# Patient Record
Sex: FEMALE | Race: WHITE | Employment: UNEMPLOYED | URBAN - METROPOLITAN AREA
[De-identification: names, ages, dates, MRNs, and addresses within clinical notes are randomized per-mention and may not be internally consistent; named-entity substitution may affect disease eponyms.]

---

## 2017-02-15 RX ORDER — METOPROLOL SUCCINATE 25 MG/1
TABLET, EXTENDED RELEASE ORAL
Qty: 30 TAB | Refills: 5 | Status: SHIPPED | OUTPATIENT
Start: 2017-02-15 | End: 2017-03-08 | Stop reason: SDUPTHER

## 2017-03-08 ENCOUNTER — OFFICE VISIT (OUTPATIENT)
Dept: CARDIOLOGY CLINIC | Age: 42
End: 2017-03-08

## 2017-03-08 VITALS
WEIGHT: 182.6 LBS | OXYGEN SATURATION: 98 % | BODY MASS INDEX: 32.36 KG/M2 | RESPIRATION RATE: 18 BRPM | SYSTOLIC BLOOD PRESSURE: 120 MMHG | DIASTOLIC BLOOD PRESSURE: 78 MMHG | HEART RATE: 91 BPM | HEIGHT: 63 IN

## 2017-03-08 DIAGNOSIS — I10 ESSENTIAL HYPERTENSION: ICD-10-CM

## 2017-03-08 DIAGNOSIS — I49.9 IRREGULAR HEART BEAT: Primary | ICD-10-CM

## 2017-03-08 RX ORDER — METOPROLOL SUCCINATE 25 MG/1
25 TABLET, EXTENDED RELEASE ORAL DAILY
Qty: 90 TAB | Refills: 3 | Status: SHIPPED | OUTPATIENT
Start: 2017-03-08 | End: 2018-03-11 | Stop reason: SDUPTHER

## 2017-03-08 RX ORDER — LOSARTAN POTASSIUM 100 MG/1
100 TABLET ORAL DAILY
Qty: 90 TAB | Refills: 3 | Status: SHIPPED | OUTPATIENT
Start: 2017-03-08 | End: 2018-04-03 | Stop reason: SDUPTHER

## 2017-03-08 RX ORDER — PANTOPRAZOLE SODIUM 40 MG/1
40 TABLET, DELAYED RELEASE ORAL DAILY
Refills: 3 | COMMUNITY
Start: 2017-02-13 | End: 2017-12-12

## 2017-03-08 RX ORDER — OLOPATADINE HYDROCHLORIDE 665 UG/1
1 SPRAY NASAL DAILY
Refills: 3 | COMMUNITY
Start: 2017-01-17 | End: 2019-03-14

## 2017-03-08 NOTE — PROGRESS NOTES
NAME:  Brennen Quiroz   :   1975   MRN:   [id number]   PCP:  Graciela Rudolph MD           Subjective: The patient is a 42y.o. year old female  who returns for a routine follow-up. Since the last visit, patient reports no change in exercise tolerance, chest pain, edema, medication intolerance, palpitations, shortness of breath, PND/orthopnea wheezing, sputum, syncope, dizziness or light headedness. Doing well. Past Medical History:   Diagnosis Date    Acid reflux     Anemia     Anxiety     Asthma     no attack in yrs    Headache     Hx of colonoscopy     x 2    Hypercholesterolemia     Hypertension     Irritable bowel syndrome (IBS)     Nausea & vomiting     Other ill-defined conditions(799.89)     hypoglycemia fibromyalgia    Pneumonia         ICD-10-CM ICD-9-CM    1. Irregular heart beat I49.9 427.9 AMB POC EKG ROUTINE /  LEADS, INTER & REP   2. Essential hypertension I10 401.9       Social History   Substance Use Topics    Smoking status: Never Smoker    Smokeless tobacco: Never Used    Alcohol use 1.2 - 1.8 oz/week     2 - 3 Glasses of wine per week      Comment: occ      Family History   Problem Relation Age of Onset    Cancer Maternal Grandmother      breast abdomnal    Cancer Paternal Grandmother      panreatic    Headache Father     Headache Sister     Cancer Child      spina bifida        Review of Systems  General: Pt denies excessive weight gain or loss. Pt is able to conduct ADL's  HEENT: Denies blurred vision, headaches, epistaxis and difficulty swallowing. Respiratory: Denies shortness of breath, SHANNON, wheezing or stridor.   Cardiovascular: Denies precordial pain, palpitations, edema or PND  Gastrointestinal: Denies poor appetite, indigestion, abdominal pain or blood in stool  Musculoskeletal: Denies pain or swelling from muscles or joints  Neurologic: Denies tremor, paresthesias, or sensory motor disturbance  Skin: Denies rash, itching or texture change. Objective:       Vitals:    03/08/17 1059 03/08/17 1108   BP: 122/70 120/78   Pulse: 91    Resp: 18    SpO2: 98%    Weight: 182 lb 9.6 oz (82.8 kg)    Height: 5' 3\" (1.6 m)     Body mass index is 32.35 kg/(m^2). General PE  Mental Status - Alert. General Appearance - Not in acute distress. Chest and Lung Exam   Inspection: Accessory muscles - No use of accessory muscles in breathing. Auscultation:   Breath sounds: - Normal.    Cardiovascular   Inspection: Jugular vein - Bilateral - Inspection Normal.  Palpation/Percussion:   Apical Impulse: - Normal.  Auscultation: Rhythm - Regular. Heart Sounds - S1 WNL and S2 WNL. No S3 or S4. Murmurs & Other Heart Sounds: Auscultation of the heart reveals - No Murmurs. Peripheral Vascular   Upper Extremity: Inspection - Bilateral - No Cyanotic nailbeds or Digital clubbing. Lower Extremity:   Palpation: Edema - Bilateral - No edema. Data Review:     EKG -EKG: normal EKG, normal sinus rhythm, unchanged from previous tracings. Medications reviewed  Current Outpatient Prescriptions   Medication Sig    olopatadine (PATANASE) 0.6 % spry 1 Squirt by Both Nostrils route daily.  pantoprazole (PROTONIX) 40 mg tablet Take 40 mg by mouth daily.  metoprolol succinate (TOPROL-XL) 25 mg XL tablet Take 1 Tab by mouth daily.  losartan (COZAAR) 100 mg tablet Take 1 Tab by mouth daily.  promethazine (PHENERGAN) 25 mg tablet Take 1 Tab by mouth every six (6) hours as needed for Nausea.  CRYSELLE, 28, 0.3-30 mg-mcg tab     escitalopram oxalate (LEXAPRO) 10 mg tablet Take 1 Tab by mouth daily.  zolpidem (AMBIEN) 5 mg tablet Take 1 Tab by mouth nightly as needed.  diazePAM (VALIUM) 2 mg tablet Take 1 Tab by mouth every eight (8) hours as needed (dizziness). Max Daily Amount: 6 mg.  RABEprazole (ACIPHEX) 20 mg tablet Take 100 mg by mouth daily. No current facility-administered medications for this visit. Assessment:       ICD-10-CM ICD-9-CM    1. Irregular heart beat I49.9 427.9 AMB POC EKG ROUTINE W/ 12 LEADS, INTER & REP   2. Essential hypertension I10 401.9         Plan:     Patient presents doing well and stable from cardiac standpoint. Feeling well on current therapy. Continue current care and follow up in one year. .    Lorenzo Cruz MD

## 2017-03-08 NOTE — MR AVS SNAPSHOT
Visit Information Date & Time Provider Department Dept. Phone Encounter #  
 3/8/2017 11:15 AM Leslye Mcfadden MD [facility] [phone] [id number] Your Appointments 4/21/2017 11:40 AM  
Follow Up with Rian Barbour MD  
[facility] at [facility] [address]) Appt Note: f/u migraine, $40cp cc jrb 10/3/16  
 [address] [place] [address], 
[address] [facility], [address] [address]  
[address], [address] [facility], [address] [address] Upcoming Health Maintenance Date Due DTaP/Tdap/Td series (1 - Tdap) 4/19/1995 PAP AKA CERVICAL CYTOLOGY 4/19/1995 INFLUENZA AGE 9 TO ADULT 8/1/2016 Allergies as of 3/8/2017  Review Complete On: 3/8/2017 By: Nayeli Mcgowan NP Severity Noted Reaction Type Reactions Avelox [Moxifloxacin] High 08/21/2011   Systemic Other (comments) Elevated liver enzymes Erythromycin Medium 04/12/2010   Intolerance Nausea and Vomiting Levaquin [Levofloxacin] Medium 04/12/2010   Intolerance Nausea and Vomiting Zithromax [Azithromycin] Medium 04/12/2010   Side Effect Hives Current Immunizations  Reviewed on 11/15/2016 No immunizations on file. Not reviewed this visit You Were Diagnosed With   
  
 Codes Comments Irregular heart beat    -  Primary ICD-10-CM: I49.9 ICD-9-CM: 427.9 Essential hypertension     ICD-10-CM: I10 
ICD-9-CM: 401.9 Vitals BP Pulse Resp Height(growth percentile) Weight(growth percentile) SpO2  
 120/78 (BP 1 Location: Right arm, BP Patient Position: Sitting) 91 18 5' 3\" (1.6 m) 182 lb 9.6 oz (82.8 kg) 98% BMI OB Status Smoking Status 32.35 kg/m2 Having regular periods Never Smoker Vitals History BMI and BSA Data Body Mass Index Body Surface Area  
 32.35 kg/m 2 1.92 m 2 Preferred Pharmacy Pharmacy Name Phone [facility]/PHARMACY #[id number]- [place] [phone] [facility] [phone] Your Updated Medication List  
  
   
This list is accurate as of: 3/8/17 11:36 AM.  Always use your most recent med list.  
  
  
  
  
 ACIPHEX 20 mg tablet Generic drug:  RABEprazole Take 100 mg by mouth daily. CRYSELLE (28) 0.3-30 mg-mcg Tab Generic drug:  norgestrel-ethinyl estradiol  
  
 diazePAM 2 mg tablet Commonly known as:  VALIUM Take 1 Tab by mouth every eight (8) hours as needed (dizziness). Max Daily Amount: 6 mg.  
  
 escitalopram oxalate 10 mg tablet Commonly known as:  Shan Barrs Take 1 Tab by mouth daily. losartan 100 mg tablet Commonly known as:  COZAAR Take 1 Tab by mouth daily. metoprolol succinate 25 mg XL tablet Commonly known as:  TOPROL-XL Take 1 Tab by mouth daily. olopatadine 0.6 % Spry Commonly known as:  PATANASE  
1 Squirt by Both Nostrils route daily. pantoprazole 40 mg tablet Commonly known as:  PROTONIX Take 40 mg by mouth daily. promethazine 25 mg tablet Commonly known as:  PHENERGAN Take 1 Tab by mouth every six (6) hours as needed for Nausea. zolpidem 5 mg tablet Commonly known as:  AMBIEN Take 1 Tab by mouth nightly as needed. Prescriptions Sent to Pharmacy Refills  
 metoprolol succinate (TOPROL-XL) 25 mg XL tablet 3 Sig: Take 1 Tab by mouth daily. Class: Normal  
 Pharmacy: [facility]/pharmacy #[id number]- [address] Ph #: [phone] Route: Oral  
 losartan (COZAAR) 100 mg tablet 3 Sig: Take 1 Tab by mouth daily. Class: Normal  
 Pharmacy: [facility]/pharmacy #[id number]- [address] Ph #: [phone] Route: Oral  
  
We Performed the Following AMB POC EKG ROUTINE W/ 12 LEADS, INTER & REP [27958 CPT(R)] Introducing \[facility] [place] [facility]\"" & HEALTH SERVICES! Dear [address]: Thank you for requesting a [employer] account. Our records indicate that you already have an active Par[employer] account. You can access your account anytime at [email or web address] [email or web address] Did you know that you can access your hospital and ER discharge instructions at any time in [employer]? You can also review all of your test results from your hospital stay or ER visit. Additional Information If you have questions, please visit the Frequently Asked Questions section of the [employer] website at [email or web address] Remember, [employer] is NOT to be used for urgent needs. For medical emergencies, dial 911. Now available from your iPhone and Android! Please provide this summary of care documentation to your next provider. Your primary care clinician is listed as Leeroy Gutierrez. If you have any questions after today's visit, please call [phone].

## 2017-12-12 ENCOUNTER — APPOINTMENT (OUTPATIENT)
Dept: GENERAL RADIOLOGY | Age: 42
End: 2017-12-12
Attending: EMERGENCY MEDICINE
Payer: COMMERCIAL

## 2017-12-12 ENCOUNTER — HOSPITAL ENCOUNTER (EMERGENCY)
Age: 42
Discharge: HOME | End: 2017-12-12
Attending: EMERGENCY MEDICINE
Payer: COMMERCIAL

## 2017-12-12 VITALS
OXYGEN SATURATION: 99 % | DIASTOLIC BLOOD PRESSURE: 100 MMHG | HEIGHT: 63 IN | SYSTOLIC BLOOD PRESSURE: 140 MMHG | HEART RATE: 76 BPM | TEMPERATURE: 98.2 F | WEIGHT: 185 LBS | RESPIRATION RATE: 19 BRPM | BODY MASS INDEX: 32.78 KG/M2

## 2017-12-12 DIAGNOSIS — R00.2 PALPITATIONS: ICD-10-CM

## 2017-12-12 DIAGNOSIS — J01.90 ACUTE NON-RECURRENT SINUSITIS, UNSPECIFIED LOCATION: ICD-10-CM

## 2017-12-12 DIAGNOSIS — R42 LIGHTHEADEDNESS: Primary | ICD-10-CM

## 2017-12-12 LAB
ALBUMIN SERPL-MCNC: 3.5 G/DL (ref 3.5–5)
ALBUMIN/GLOB SERPL: 0.9 {RATIO} (ref 1.1–2.2)
ALP SERPL-CCNC: 89 U/L (ref 45–117)
ALT SERPL-CCNC: 39 U/L (ref 12–78)
ANION GAP SERPL CALC-SCNC: 10 MMOL/L (ref 5–15)
APPEARANCE UR: ABNORMAL
AST SERPL-CCNC: 19 U/L (ref 15–37)
BACTERIA URNS QL MICRO: NEGATIVE /HPF
BASOPHILS # BLD: 0 K/UL (ref 0–0.1)
BASOPHILS NFR BLD: 0 % (ref 0–1)
BILIRUB SERPL-MCNC: 0.3 MG/DL (ref 0.2–1)
BILIRUB UR QL: NEGATIVE
BUN SERPL-MCNC: 9 MG/DL (ref 6–20)
BUN/CREAT SERPL: 9 (ref 12–20)
CALCIUM SERPL-MCNC: 8.5 MG/DL (ref 8.5–10.1)
CHLORIDE SERPL-SCNC: 105 MMOL/L (ref 97–108)
CO2 SERPL-SCNC: 26 MMOL/L (ref 21–32)
COLOR UR: ABNORMAL
CREAT SERPL-MCNC: 0.99 MG/DL (ref 0.55–1.02)
D DIMER PPP FEU-MCNC: 0.2 MG/L FEU (ref 0–0.65)
EOSINOPHIL # BLD: 0.2 K/UL (ref 0–0.4)
EOSINOPHIL NFR BLD: 2 % (ref 0–7)
EPITH CASTS URNS QL MICRO: ABNORMAL /LPF
ERYTHROCYTE [DISTWIDTH] IN BLOOD BY AUTOMATED COUNT: 14.3 % (ref 11.5–14.5)
GLOBULIN SER CALC-MCNC: 3.9 G/DL (ref 2–4)
GLUCOSE SERPL-MCNC: 75 MG/DL (ref 65–100)
GLUCOSE UR STRIP.AUTO-MCNC: NEGATIVE MG/DL
HCG UR QL: NEGATIVE
HCT VFR BLD AUTO: 40.1 % (ref 35–47)
HGB BLD-MCNC: 13.8 G/DL (ref 11.5–16)
HGB UR QL STRIP: NEGATIVE
HYALINE CASTS URNS QL MICRO: ABNORMAL /LPF (ref 0–5)
KETONES UR QL STRIP.AUTO: NEGATIVE MG/DL
LEUKOCYTE ESTERASE UR QL STRIP.AUTO: ABNORMAL
LYMPHOCYTES # BLD: 3.6 K/UL (ref 0.8–3.5)
LYMPHOCYTES NFR BLD: 28 % (ref 12–49)
MCH RBC QN AUTO: 28.1 PG (ref 26–34)
MCHC RBC AUTO-ENTMCNC: 34.4 G/DL (ref 30–36.5)
MCV RBC AUTO: 81.7 FL (ref 80–99)
MONOCYTES # BLD: 0.6 K/UL (ref 0–1)
MONOCYTES NFR BLD: 5 % (ref 5–13)
NEUTS SEG # BLD: 8.3 K/UL (ref 1.8–8)
NEUTS SEG NFR BLD: 65 % (ref 32–75)
NITRITE UR QL STRIP.AUTO: NEGATIVE
PH UR STRIP: 6.5 [PH] (ref 5–8)
PLATELET # BLD AUTO: 324 K/UL (ref 150–400)
POTASSIUM SERPL-SCNC: 2.9 MMOL/L (ref 3.5–5.1)
PROT SERPL-MCNC: 7.4 G/DL (ref 6.4–8.2)
PROT UR STRIP-MCNC: NEGATIVE MG/DL
RBC # BLD AUTO: 4.91 M/UL (ref 3.8–5.2)
RBC #/AREA URNS HPF: ABNORMAL /HPF (ref 0–5)
SODIUM SERPL-SCNC: 141 MMOL/L (ref 136–145)
SP GR UR REFRACTOMETRY: 1.01 (ref 1–1.03)
TROPONIN I SERPL-MCNC: <0.04 NG/ML
UA: UC IF INDICATED,UAUC: ABNORMAL
UROBILINOGEN UR QL STRIP.AUTO: 0.2 EU/DL (ref 0.2–1)
WBC # BLD AUTO: 12.7 K/UL (ref 3.6–11)
WBC URNS QL MICRO: ABNORMAL /HPF (ref 0–4)

## 2017-12-12 PROCEDURE — 80053 COMPREHEN METABOLIC PANEL: CPT | Performed by: EMERGENCY MEDICINE

## 2017-12-12 PROCEDURE — 81001 URINALYSIS AUTO W/SCOPE: CPT | Performed by: EMERGENCY MEDICINE

## 2017-12-12 PROCEDURE — 74011250636 HC RX REV CODE- 250/636: Performed by: EMERGENCY MEDICINE

## 2017-12-12 PROCEDURE — 87086 URINE CULTURE/COLONY COUNT: CPT | Performed by: EMERGENCY MEDICINE

## 2017-12-12 PROCEDURE — 96360 HYDRATION IV INFUSION INIT: CPT

## 2017-12-12 PROCEDURE — 85379 FIBRIN DEGRADATION QUANT: CPT | Performed by: EMERGENCY MEDICINE

## 2017-12-12 PROCEDURE — 74011250637 HC RX REV CODE- 250/637: Performed by: EMERGENCY MEDICINE

## 2017-12-12 PROCEDURE — 93005 ELECTROCARDIOGRAM TRACING: CPT

## 2017-12-12 PROCEDURE — 36415 COLL VENOUS BLD VENIPUNCTURE: CPT | Performed by: EMERGENCY MEDICINE

## 2017-12-12 PROCEDURE — 81025 URINE PREGNANCY TEST: CPT

## 2017-12-12 PROCEDURE — 99284 EMERGENCY DEPT VISIT MOD MDM: CPT

## 2017-12-12 PROCEDURE — 84484 ASSAY OF TROPONIN QUANT: CPT | Performed by: EMERGENCY MEDICINE

## 2017-12-12 PROCEDURE — 85025 COMPLETE CBC W/AUTO DIFF WBC: CPT | Performed by: EMERGENCY MEDICINE

## 2017-12-12 PROCEDURE — 71020 XR CHEST PA LAT: CPT

## 2017-12-12 RX ORDER — POTASSIUM CHLORIDE 750 MG/1
60 TABLET, FILM COATED, EXTENDED RELEASE ORAL
Status: DISCONTINUED | OUTPATIENT
Start: 2017-12-12 | End: 2017-12-12 | Stop reason: ALTCHOICE

## 2017-12-12 RX ORDER — CETIRIZINE HCL 10 MG
TABLET ORAL DAILY
COMMUNITY

## 2017-12-12 RX ORDER — POTASSIUM CHLORIDE 1.5 G/1.77G
60 POWDER, FOR SOLUTION ORAL
Status: COMPLETED | OUTPATIENT
Start: 2017-12-12 | End: 2017-12-12

## 2017-12-12 RX ADMIN — POTASSIUM CHLORIDE 60 MEQ: 1.5 POWDER, FOR SOLUTION ORAL at 18:36

## 2017-12-12 RX ADMIN — SODIUM CHLORIDE 1000 ML: 900 INJECTION, SOLUTION INTRAVENOUS at 14:55

## 2017-12-12 NOTE — ED NOTES
Received pt to exam room for c/o feeling like her heart was racing. Pt went to [facility] and states almost passed out. She was also having some chest pressure at the time of this occurrence. [facility] gave 4 baby ASA and called EMS.

## 2017-12-12 NOTE — ED NOTES
Pt alert shin warm dry pink po KCL given spouse at bedside, d/c instructions reviewed by MD, pt to exit without difficulty or questions

## 2017-12-12 NOTE — DISCHARGE INSTRUCTIONS
Lightheadedness or Faintness: Care Instructions  Your Care Instructions  Lightheadedness is a feeling that you are about to faint or \"pass out. \" You do not feel as if you or your surroundings are moving. It is different from vertigo, which is the feeling that you or things around you are spinning or tilting. Lightheadedness usually goes away or gets better when you lie down. If lightheadedness gets worse, it can lead to a fainting spell. It is common to feel lightheaded from time to time. Lightheadedness usually is not caused by a serious problem. It often is caused by a short-lasting drop in blood pressure and blood flow to your head that occurs when you get up too quickly from a seated or lying position. Follow-up care is a key part of your treatment and safety. Be sure to make and go to all appointments, and call your doctor if you are having problems. It's also a good idea to know your test results and keep a list of the medicines you take. How can you care for yourself at home? · Lie down for 1 or 2 minutes when you feel lightheaded. After lying down, sit up slowly and remain sitting for 1 to 2 minutes before slowly standing up. · Avoid movements, positions, or activities that have made you lightheaded in the past.  · Get plenty of rest, especially if you have a cold or flu, which can cause lightheadedness. · Make sure you drink plenty of fluids, especially if you have a fever or have been sweating. · Do not drive or put yourself and others in danger while you feel lightheaded. When should you call for help? Call 911 anytime you think you may need emergency care. For example, call if:  ? · You have symptoms of a stroke. These may include:  ¨ Sudden numbness, tingling, weakness, or loss of movement in your face, arm, or leg, especially on only one side of your body. ¨ Sudden vision changes. ¨ Sudden trouble speaking. ¨ Sudden confusion or trouble understanding simple statements.   ¨ Sudden problems with walking or balance. ¨ A sudden, severe headache that is different from past headaches. ? · You have symptoms of a heart attack. These may include:  ¨ Chest pain or pressure, or a strange feeling in the chest.  ¨ Sweating. ¨ Shortness of breath. ¨ Nausea or vomiting. ¨ Pain, pressure, or a strange feeling in the back, neck, jaw, or upper belly or in one or both shoulders or arms. ¨ Lightheadedness or sudden weakness. ¨ A fast or irregular heartbeat. After you call 911, the  may tell you to chew 1 adult-strength or 2 to 4 low-dose aspirin. Wait for an ambulance. Do not try to drive yourself. ? Watch closely for changes in your health, and be sure to contact your doctor if:  ? · Your lightheadedness gets worse or does not get better with home care. Where can you learn more? Go to http://hansD-Sightjannette.info/. Enter Q123 in the search box to learn more about \"Lightheadedness or Faintness: Care Instructions. \"  Current as of: March 20, 2017  Content Version: 11.4  © 6129-8076 [employer]. Care instructions adapted under license by [employer] (which disclaims liability or warranty for this information). If you have questions about a medical condition or this instruction, always ask your healthcare professional. [address] any warranty or liability for your use of this information. Palpitations: Care Instructions  Your Care Instructions    Heart palpitations are the uncomfortable sensation that your heart is beating fast or irregularly. You might feel pounding or fluttering in your chest. It might feel like your heart is skipping a beat. Although palpitations may be caused by a heart problem, they also occur because of stress, fatigue, or use of alcohol, caffeine, or nicotine. Many medicines, including diet pills, antihistamines, decongestants, and some herbal products, can cause heart palpitations.  Nearly everyone has palpitations from time to time. Depending on your symptoms, your doctor may need to do more tests to try to find the cause of your palpitations. Follow-up care is a key part of your treatment and safety. Be sure to make and go to all appointments, and call your doctor if you are having problems. It's also a good idea to know your test results and keep a list of the medicines you take. How can you care for yourself at home? · Avoid caffeine, nicotine, and excess alcohol. · Do not take illegal drugs, such as methamphetamines and cocaine. · Do not take weight loss or diet medicines unless you talk with your doctor first.  · Get plenty of sleep. · Do not overeat. · If you have palpitations again, take deep breaths and try to relax. This may slow a racing heart. · If you start to feel lightheaded, lie down to avoid injuries that might result if you pass out and fall down. · Keep a record of your palpitations and bring it to your next doctor's appointment. Write down:  ¨ The date and time. ¨ Your pulse. (If your heart is beating fast, it may be hard to count your pulse.)  ¨ What you were doing when the palpitations started. ¨ How long the palpitations lasted. ¨ Any other symptoms. · If an activity causes palpitations, slow down or stop. Talk to your doctor before you do that activity again. · Take your medicines exactly as prescribed. Call your doctor if you think you are having a problem with your medicine. When should you call for help? Call 911 anytime you think you may need emergency care. For example, call if:  ? · You passed out (lost consciousness). ? · You have symptoms of a heart attack. These may include:  ¨ Chest pain or pressure, or a strange feeling in the chest.  ¨ Sweating. ¨ Shortness of breath. ¨ Pain, pressure, or a strange feeling in the back, neck, jaw, or upper belly or in one or both shoulders or arms. ¨ Lightheadedness or sudden weakness.   ¨ A fast or irregular heartbeat. After you call 911, the  may tell you to chew 1 adult-strength or 2 to 4 low-dose aspirin. Wait for an ambulance. Do not try to drive yourself. ? · You have symptoms of a stroke. These may include:  ¨ Sudden numbness, tingling, weakness, or loss of movement in your face, arm, or leg, especially on only one side of your body. ¨ Sudden vision changes. ¨ Sudden trouble speaking. ¨ Sudden confusion or trouble understanding simple statements. ¨ Sudden problems with walking or balance. ¨ A sudden, severe headache that is different from past headaches. ?Call your doctor now or seek immediate medical care if:  ? · You have heart palpitations and:  ¨ Are dizzy or lightheaded, or you feel like you may faint. ¨ Have new or increased shortness of breath. ? Watch closely for changes in your health, and be sure to contact your doctor if:  ? · You continue to have heart palpitations. Where can you learn more? Go to http://hans-jannette.info/. Enter R508 in the search box to learn more about \"Palpitations: Care Instructions. \"  Current as of: September 21, 2016  Content Version: 11.4  © 2485-8710 [email or web address]. Care instructions adapted under license by [employer] (which disclaims liability or warranty for this information). If you have questions about a medical condition or this instruction, always ask your healthcare professional. [place] [address] any warranty or liability for your use of this information.

## 2017-12-12 NOTE — ED PROVIDER NOTES
EMERGENCY DEPARTMENT HISTORY AND PHYSICAL EXAM      Date: 12/12/2017  Patient Name: Brennen Quiroz    History of Presenting Illness     Chief Complaint   Patient presents with    Other     Not feeling well and feeling like heart racing - referred by [facility]     History Provided By: Patient    HPI: Brennen Quiroz, 36 y.o. female with PMHx significant for fibromyalgia, HTN, asthma and mitral valve prolapse, presents via EMS to the ED with cc of sudden onset palpitations with lightheadedness, diaphoresis, and nausea that began around 12:00 PM this afternoon. Pt reports going to [facility] where she almost passed out and was transported to the ED. She notes chest tightness ongoing 2 days. Pt states that she drove to [facility] and went on a cruise 2 weeks ago. She began to experienced congestion/cough/ear pain and was diagnosed with a sinus infection by [facility] 1 week ago and was discharged with Augmentin. She notes experiencing right calf pain 3 weeks ago but has not had it since. Pt takes hormonal birth control pills. She denies a history of DVT/PE. Pt specifically denies appetite change, hemoptysis, throat pain, abdominal pain, syncope, vomiting, diarrhea, dysuria, hematuria, or SOB. PCP: Melisa Frost MD     Social Hx: - Tobacco, + (weekly) EtOH, - Illicit Drugs    There are no other complaints, changes, or physical findings at this time. Current Outpatient Prescriptions   Medication Sig Dispense Refill    cetirizine (ZYRTEC) 10 mg tablet Take  by mouth.  olopatadine (PATANASE) 0.6 % spry 1 Squirt by Both Nostrils route daily. 3    metoprolol succinate (TOPROL-XL) 25 mg XL tablet Take 1 Tab by mouth daily. 90 Tab 3    losartan (COZAAR) 100 mg tablet Take 1 Tab by mouth daily. 90 Tab 3    promethazine (PHENERGAN) 25 mg tablet Take 1 Tab by mouth every six (6) hours as needed for Nausea.  12 Tab 0    CRYSELLE, 28, 0.3-30 mg-mcg tab   12    escitalopram oxalate (LEXAPRO) 10 mg tablet Take 1 Tab by mouth daily. 30 Tab 1    zolpidem (AMBIEN) 5 mg tablet Take 1 Tab by mouth nightly as needed. 30 Tab 1    RABEprazole (ACIPHEX) 20 mg tablet Take 100 mg by mouth daily. Past History     Past Medical History:  Past Medical History:   Diagnosis Date    Acid reflux     Anemia     Anxiety     Asthma     no attack in yrs    Headache     Hx of colonoscopy     x 2    Hypercholesterolemia     Hypertension     Irritable bowel syndrome (IBS)     Nausea & vomiting     Other ill-defined conditions(799.89)     hypoglycemia fibromyalgia    Pneumonia 9/13     Past Surgical History:  Past Surgical History:   Procedure Laterality Date    HX APPENDECTOMY  8/15/14    HX CHOLECYSTECTOMY  2007    gallstones pancratitis     HX ENDOSCOPY      x 3    HX GYN      vaginal birth x 1    HX HEENT      lasix    HX HEENT      bilateral eye surgery    HX HEENT      oral surgery    HX HEENT      septal surg     Family History:  Family History   Problem Relation Age of Onset    Cancer Maternal Grandmother      breast abdomnal    Cancer Paternal Grandmother      panreatic    Headache Father     Headache Sister     Cancer Child      spina bifida     Social History:  Social History   Substance Use Topics    Smoking status: Never Smoker    Smokeless tobacco: Never Used    Alcohol use 1.2 - 1.8 oz/week     2 - 3 Glasses of wine per week      Comment: occ     Allergies: Allergies   Allergen Reactions    Avelox [Moxifloxacin] Other (comments)     Elevated liver enzymes    Erythromycin Nausea and Vomiting    Levaquin [Levofloxacin] Nausea and Vomiting    Zithromax [Azithromycin] Hives     Review of Systems   Review of Systems   Constitutional: Positive for diaphoresis. Negative for appetite change, chills, fatigue and fever. HENT: Positive for congestion and ear pain. Negative for rhinorrhea and sore throat. Eyes: Negative for pain, discharge and visual disturbance.    Respiratory: Positive for cough and chest tightness. Negative for shortness of breath and wheezing.         - hemoptysis    Cardiovascular: Positive for palpitations. Negative for leg swelling. Gastrointestinal: Positive for nausea. Negative for abdominal pain, constipation, diarrhea and vomiting. Genitourinary: Negative for dysuria, frequency and hematuria. Musculoskeletal: Negative for arthralgias, back pain and myalgias. Skin: Negative for rash. Neurological: Positive for dizziness and light-headedness. Negative for syncope, weakness and headaches. Psychiatric/Behavioral: Negative. Physical Exam   Physical Exam   Constitutional: She is oriented to person, place, and time. She appears well-developed and well-nourished. No distress. HENT:   Head: Normocephalic and atraumatic. Right Ear: Tympanic membrane normal.   Left Ear: Tympanic membrane normal.   Mouth/Throat: Oropharynx is clear and moist. No oropharyngeal exudate. Eyes: EOM are normal. Right eye exhibits no discharge. Left eye exhibits no discharge. No scleral icterus. Neck: Normal range of motion. Neck supple. No tracheal deviation present. Cardiovascular: Normal rate, regular rhythm, normal heart sounds and intact distal pulses. Exam reveals no gallop and no friction rub. No murmur heard. Pulmonary/Chest: Effort normal and breath sounds normal. No respiratory distress. She has no wheezes. She has no rales. Abdominal: Soft. She exhibits no distension. There is no tenderness. Musculoskeletal: Normal range of motion. She exhibits no edema. No right calf erythema, edema, or tenderness    Lymphadenopathy:     She has no cervical adenopathy. Neurological: She is alert and oriented to person, place, and time. No focal neuro deficits   Skin: Skin is warm and dry. No rash noted. Psychiatric: She has a normal mood and affect. Nursing note and vitals reviewed.     Diagnostic Study Results     Labs -     Recent Results (from the past 12 hour(s))   EKG, 12 LEAD, INITIAL    Collection Time: 12/12/17  3:05 PM   Result Value Ref Range    Ventricular Rate 71 BPM    Atrial Rate 71 BPM    P-R Interval 158 ms    QRS Duration 82 ms    Q-T Interval 404 ms    QTC Calculation (Bezet) 439 ms    Calculated P Axis 52 degrees    Calculated R Axis 19 degrees    Calculated T Axis 41 degrees    Diagnosis       Normal sinus rhythm  Normal ECG  When compared with ECG of 15-NOV-2016 10:36,  No significant change was found     CBC WITH AUTOMATED DIFF    Collection Time: 12/12/17  3:18 PM   Result Value Ref Range    WBC 12.7 (H) 3.6 - 11.0 K/uL    RBC 4.91 3.80 - 5.20 M/uL    HGB 13.8 11.5 - 16.0 g/dL    HCT 40.1 35.0 - 47.0 %    MCV 81.7 80.0 - 99.0 FL    MCH 28.1 26.0 - 34.0 PG    MCHC 34.4 30.0 - 36.5 g/dL    RDW 14.3 11.5 - 14.5 %    PLATELET 016 888 - 042 K/uL    NEUTROPHILS 65 32 - 75 %    LYMPHOCYTES 28 12 - 49 %    MONOCYTES 5 5 - 13 %    EOSINOPHILS 2 0 - 7 %    BASOPHILS 0 0 - 1 %    ABS. NEUTROPHILS 8.3 (H) 1.8 - 8.0 K/UL    ABS. LYMPHOCYTES 3.6 (H) 0.8 - 3.5 K/UL    ABS. MONOCYTES 0.6 0.0 - 1.0 K/UL    ABS. EOSINOPHILS 0.2 0.0 - 0.4 K/UL    ABS. BASOPHILS 0.0 0.0 - 0.1 K/UL   METABOLIC PANEL, COMPREHENSIVE    Collection Time: 12/12/17  3:18 PM   Result Value Ref Range    Sodium 141 136 - 145 mmol/L    Potassium 2.9 (L) 3.5 - 5.1 mmol/L    Chloride 105 97 - 108 mmol/L    CO2 26 21 - 32 mmol/L    Anion gap 10 5 - 15 mmol/L    Glucose 75 65 - 100 mg/dL    BUN 9 6 - 20 MG/DL    Creatinine 0.99 0.55 - 1.02 MG/DL    BUN/Creatinine ratio 9 (L) 12 - 20      GFR est AA >60 >60 ml/min/1.73m2    GFR est non-AA >60 >60 ml/min/1.73m2    Calcium 8.5 8.5 - 10.1 MG/DL    Bilirubin, total 0.3 0.2 - 1.0 MG/DL    ALT (SGPT) 39 12 - 78 U/L    AST (SGOT) 19 15 - 37 U/L    Alk.  phosphatase 89 45 - 117 U/L    Protein, total 7.4 6.4 - 8.2 g/dL    Albumin 3.5 3.5 - 5.0 g/dL    Globulin 3.9 2.0 - 4.0 g/dL    A-G Ratio 0.9 (L) 1.1 - 2.2     TROPONIN I    Collection Time: 12/12/17  3:18 PM   Result Value Ref Range    Troponin-I, Qt. <0.04 <0.05 ng/mL   D DIMER    Collection Time: 12/12/17  3:18 PM   Result Value Ref Range    D-dimer 0.20 0.00 - 0.65 mg/L FEU   URINALYSIS W/ REFLEX CULTURE    Collection Time: 12/12/17  3:18 PM   Result Value Ref Range    Color YELLOW/STRAW      Appearance CLOUDY (A) CLEAR      Specific gravity 1.010 1.003 - 1.030      pH (UA) 6.5 5.0 - 8.0      Protein NEGATIVE  NEG mg/dL    Glucose NEGATIVE  NEG mg/dL    Ketone NEGATIVE  NEG mg/dL    Bilirubin NEGATIVE  NEG      Blood NEGATIVE  NEG      Urobilinogen 0.2 0.2 - 1.0 EU/dL    Nitrites NEGATIVE  NEG      Leukocyte Esterase MODERATE (A) NEG      WBC 20-50 0 - 4 /hpf    RBC 0-5 0 - 5 /hpf    Epithelial cells MODERATE (A) FEW /lpf    Bacteria NEGATIVE  NEG /hpf    UA:UC IF INDICATED URINE CULTURE ORDERED (A) CNI      Hyaline cast 0-2 0 - 5 /lpf   HCG URINE, QL. - POC    Collection Time: 12/12/17  3:19 PM   Result Value Ref Range    Pregnancy test,urine (POC) NEGATIVE  NEG       Radiologic Studies -     CXR Results  (Last 48 hours)               12/12/17 1540  XR CHEST PA LAT Final result    Impression:  IMPRESSION: No acute cardiopulmonary disease. Narrative: Indication:  palpitations x today        Exam: PA and lateral views of the chest.       Direct comparison is made to prior CXR dated July 2016. Findings: Cardiomediastinal silhouette is within normal limits. Lungs are clear   bilaterally. Pleural spaces are normal. Osseous structures are intact. Medical Decision Making   I am the first provider for this patient. I reviewed the vital signs, available nursing notes, past medical history, past surgical history, family history and social history. Vital Signs-Reviewed the patient's vital signs.   Patient Vitals for the past 12 hrs:   Temp Pulse Resp BP SpO2   12/12/17 1415 98.2 °F (36.8 °C) 76 19 (!) 140/100 99 %   12/12/17 1412 - 75 18 (!) 140/100 100 %     Pulse Oximetry Analysis - 96% on RA    Cardiac Monitor:   Rate: 76 bpm  Rhythm: Normal Sinus Rhythm      EKG interpretation: 15:05  Rhythm: normal sinus rhythm; and regular . Rate (approx.): 71; Axis: normal; CT interval: normal; QRS interval: normal ; ST/T wave: normal.  Written by Aida Castillo, ED Scribe, as dictated by Salvatore Lemons MD.    Records Reviewed: Nursing Notes and Old Medical Records    Provider Notes (Medical Decision Making):   Patient appears well on exam, afebrile. Suspect symptoms are due to recent sinus infection vs dehydration. Differential includes dehydration, URI, sinusitis, vasovagal symptoms, UTI, electrolyte abnormality, pregnancy, pneumonia, arrhythmia, ACS, PE/DVT. - CBC, CMP, troponin  - D- dimer to risk stratify for PE, DVT; exam not consistent with DVT  - UA, UPT  - CXR  - IVF    ED Course:   Initial assessment performed. The patients presenting problems have been discussed, and they are in agreement with the care plan formulated and outlined with them. I have encouraged them to ask questions as they arise throughout their visit. PROGRESS NOTE:  5:20 PM  Labs unremarkable. Telemetry reviewed, no acute events. Pt ambulated without difficulty, remained asymptomatic. Will discharge with PCP follow up. Discussed results, prescriptions and follow up plan with patient. Provided customary return to ED instructions. Patient expressed understanding. Maame Gooden MD    Disposition:  Discharge Note:  5:24 PM  The patient has been re-evaluated and is ready for discharge. Reviewed available results with patient. Counseled patient/parent/guardian on diagnosis and care plan. Patient has expressed understanding, and all questions have been answered. Patient agrees with plan and agrees to follow up as recommended, or return to the ED if their symptoms worsen. Discharge instructions have been provided and explained to the patient, along with reasons to return to the ED. PLAN:  1.    Discharge Medication List as of 12/12/2017  5:20 PM        2. Follow-up Information     Follow up With Details Comments Contact Info    Genevieve Genao MD In 2 days  [address] EMERGENCY DEPT  As needed, If symptoms worsen [address] [place] [address]  [phone]        Return to ED if worse     Diagnosis     Clinical Impression:   1. Lightheadedness    2. Palpitations    3. Acute non-recurrent sinusitis, unspecified location      Attestations:    Attestation: This note is prepared by Jacoby Bass, acting as Scribe for MD Magda Tan MD: The scribe's documentation has been prepared under my direction and personally reviewed by me in its entirety. I confirm that the note above accurately reflects all work, treatment, procedures, and medical decision making performed by me.

## 2017-12-13 LAB
ATRIAL RATE: 71 BPM
CALCULATED P AXIS, ECG09: 52 DEGREES
CALCULATED R AXIS, ECG10: 19 DEGREES
CALCULATED T AXIS, ECG11: 41 DEGREES
DIAGNOSIS, 93000: NORMAL
P-R INTERVAL, ECG05: 158 MS
Q-T INTERVAL, ECG07: 404 MS
QRS DURATION, ECG06: 82 MS
QTC CALCULATION (BEZET), ECG08: 439 MS
VENTRICULAR RATE, ECG03: 71 BPM

## 2017-12-14 LAB
BACTERIA SPEC CULT: NORMAL
CC UR VC: NORMAL
SERVICE CMNT-IMP: NORMAL

## 2018-01-29 ENCOUNTER — CLINICAL SUPPORT (OUTPATIENT)
Dept: CARDIOLOGY CLINIC | Age: 43
End: 2018-01-29

## 2018-01-29 ENCOUNTER — OFFICE VISIT (OUTPATIENT)
Dept: CARDIOLOGY CLINIC | Age: 43
End: 2018-01-29

## 2018-01-29 VITALS
OXYGEN SATURATION: 97 % | SYSTOLIC BLOOD PRESSURE: 106 MMHG | RESPIRATION RATE: 16 BRPM | WEIGHT: 188.6 LBS | HEIGHT: 63 IN | DIASTOLIC BLOOD PRESSURE: 68 MMHG | BODY MASS INDEX: 33.42 KG/M2 | HEART RATE: 81 BPM

## 2018-01-29 DIAGNOSIS — I49.9 IRREGULAR HEART BEAT: ICD-10-CM

## 2018-01-29 DIAGNOSIS — R00.2 PALPITATIONS: Primary | ICD-10-CM

## 2018-01-29 DIAGNOSIS — R00.2 PALPITATIONS: ICD-10-CM

## 2018-01-29 RX ORDER — ZOLPIDEM TARTRATE 10 MG/1
TABLET ORAL
Refills: 1 | COMMUNITY
Start: 2018-01-15

## 2018-01-29 RX ORDER — FLUCONAZOLE 150 MG/1
TABLET ORAL
COMMUNITY
Start: 2017-12-03 | End: 2018-01-29 | Stop reason: ALTCHOICE

## 2018-01-29 RX ORDER — SULFAMETHOXAZOLE AND TRIMETHOPRIM 800; 160 MG/1; MG/1
TABLET ORAL
Refills: 0 | COMMUNITY
Start: 2018-01-08 | End: 2018-01-29 | Stop reason: ALTCHOICE

## 2018-01-29 RX ORDER — CEFDINIR 300 MG/1
CAPSULE ORAL
Refills: 0 | COMMUNITY
Start: 2017-12-26 | End: 2018-01-29 | Stop reason: ALTCHOICE

## 2018-01-29 RX ORDER — PREDNISONE 20 MG/1
TABLET ORAL
Refills: 0 | COMMUNITY
Start: 2017-12-06 | End: 2018-01-29 | Stop reason: ALTCHOICE

## 2018-01-29 RX ORDER — SPIRONOLACTONE 25 MG/1
TABLET ORAL
Refills: 4 | COMMUNITY
Start: 2018-01-10

## 2018-01-29 RX ORDER — PROMETHAZINE HYDROCHLORIDE AND DEXTROMETHORPHAN HYDROBROMIDE 6.25; 15 MG/5ML; MG/5ML
SYRUP ORAL
COMMUNITY
Start: 2017-12-03 | End: 2019-03-14

## 2018-01-29 NOTE — PROGRESS NOTES
1. Have you been to the ER, urgent care clinic since your last visit? Hospitalized since your last visit? Yes, ED [facility] ED for dehydration 12/12/17    2. Have you seen or consulted any other health care providers outside of the [address] [place] [address] since your last visit? Include any pap smears or colon screening.  Yes, PCP for sinus infection    Chief Complaint   Patient presents with    Palpitations     follow up    Hand Swelling     pt c/o mild swelling to hands bilaterally with numbness and tingling to left arm

## 2018-01-29 NOTE — PROGRESS NOTES
Eduin Sage DNP, ANP-BC  Subjective/HPI:     Milvia Rhodes is a 36 y.o. female is here for routine f/u. The patient denies chest pain/ shortness of breath, orthopnea, PND, LE edema,syncope. Patient reports she has continued episodes of palpitations which occur more than 4 days apart described as sudden onset of heart rate occurring around lunchtime. She has been taking all medications as directed including beta-blocker. At times she does feel lightheaded and presyncopal.        PCP Provider  Sylvia Ricketts MD  Past Medical History:   Diagnosis Date    Acid reflux     Anemia     Anxiety     Asthma     no attack in yrs    Headache     Hx of colonoscopy     x 2    Hypercholesterolemia     Hypertension     Irritable bowel syndrome (IBS)     Nausea & vomiting     Other ill-defined conditions(799.89)     hypoglycemia fibromyalgia    Pneumonia 9/13      Past Surgical History:   Procedure Laterality Date    HX APPENDECTOMY  8/15/14    HX CHOLECYSTECTOMY  2007    gallstones pancratitis     HX ENDOSCOPY      x 3    HX GYN      vaginal birth x 1    HX HEENT      lasix    HX HEENT      bilateral eye surgery    HX HEENT      oral surgery    HX HEENT      septal surg     Allergies   Allergen Reactions    Avelox [Moxifloxacin] Other (comments)     Elevated liver enzymes    Erythromycin Nausea and Vomiting    Levaquin [Levofloxacin] Nausea and Vomiting    Zithromax [Azithromycin] Hives      Family History   Problem Relation Age of Onset    Cancer Maternal Grandmother      breast abdomnal    Cancer Paternal Grandmother      panreatic    Headache Father     Headache Sister     Cancer Child      spina bifida      Current Outpatient Prescriptions   Medication Sig    spironolactone (ALDACTONE) 25 mg tablet TAKE 1 TABLET BY MOUTH EVERY DAY    cetirizine (ZYRTEC) 10 mg tablet Take  by mouth.  metoprolol succinate (TOPROL-XL) 25 mg XL tablet Take 1 Tab by mouth daily.     losartan (COZAAR) 100 mg tablet Take 1 Tab by mouth daily.  promethazine (PHENERGAN) 25 mg tablet Take 1 Tab by mouth every six (6) hours as needed for Nausea.  CRYSELLE, 28, 0.3-30 mg-mcg tab 1 Tab daily.  escitalopram oxalate (LEXAPRO) 10 mg tablet Take 1 Tab by mouth daily.  zolpidem (AMBIEN) 5 mg tablet Take 1 Tab by mouth nightly as needed.  RABEprazole (ACIPHEX) 20 mg tablet Take 20 mg by mouth daily.  promethazine-dextromethorphan (PROMETHAZINE-DM) 6.25-15 mg/5 mL syrup     zolpidem (AMBIEN) 10 mg tablet TAKE 1 TABLET BY MOUTH EVERY EVENING AS NEEDED FOR INSOMNIA    olopatadine (PATANASE) 0.6 % spry 1 Squirt by Both Nostrils route daily. No current facility-administered medications for this visit. Vitals:    01/29/18 1003 01/29/18 1020   BP: 110/74 106/68   Pulse: 81    Resp: 16    SpO2: 97%    Weight: 188 lb 9.6 oz (85.5 kg)    Height: 5' 3\" (1.6 m)      Social History     Social History    Marital status:      Spouse name: N/A    Number of children: N/A    Years of education: N/A     Occupational History    Not on file. Social History Main Topics    Smoking status: Never Smoker    Smokeless tobacco: Never Used    Alcohol use 1.2 - 1.8 oz/week     2 - 3 Glasses of wine per week      Comment: occ    Drug use: No    Sexual activity: Yes     Partners: Male     Other Topics Concern    Not on file     Social History Narrative       I have reviewed the nurses notes, vitals, problem list, allergy list, medical history, family, social history and medications. Review of Symptoms:    General: Pt denies excessive weight gain or loss. Pt is able to conduct ADL's  HEENT: Denies blurred vision, headaches, epistaxis and difficulty swallowing. Respiratory: Denies shortness of breath, SHANNON, wheezing or stridor.   Cardiovascular: Denies precordial pain,+ palpitations,no  edema or PND  Gastrointestinal: Denies poor appetite, indigestion, abdominal pain or blood in stool  Musculoskeletal: Denies pain or swelling from muscles or joints  Neurologic: Denies tremor, paresthesias, or sensory motor disturbance  Skin: Denies rash, itching or texture change. Physical Exam:      General: Well developed, in no acute distress, cooperative and alert  HEENT: No carotid bruits, no JVD, trach is midline. Neck Supple, PEERL, EOM intact. Heart:  Normal S1/S2 negative S3 or S4. Regular, no murmur, gallop or rub.   Respiratory: Clear bilaterally x 4, no wheezing or rales  Abdomen:   Soft, non-tender, no masses, bowel sounds are active.   Extremities:  No edema, normal cap refill, no cyanosis, atraumatic. Neuro: A&Ox3, speech clear, gait stable. Skin: Skin color is normal. No rashes or lesions.  Non diaphoretic  Vascular: 2+ pulses symmetric in all extremities    Cardiographics    ECG: NSR   Results for orders placed or performed during the hospital encounter of 12/12/17   EKG, 12 LEAD, INITIAL   Result Value Ref Range    Ventricular Rate 71 BPM    Atrial Rate 71 BPM    P-R Interval 158 ms    QRS Duration 82 ms    Q-T Interval 404 ms    QTC Calculation (Bezet) 439 ms    Calculated P Axis 52 degrees    Calculated R Axis 19 degrees    Calculated T Axis 41 degrees    Diagnosis       Normal sinus rhythm  When compared with ECG of 15-NOV-2016 10:36,  No significant change was found  Confirmed by Jasmeet Wilhelm ([id number]) on 12/13/2017 8:43:47 PM           Cardiology Labs:  No results found for: CHOL, CHOLX, CHLST, CHOLV, [id number], HDL, LDL, LDLC, DLDLP, Ana Scriver, [facility], [facility]    Lab Results   Component Value Date/Time    Sodium 141 12/12/2017 03:18 PM    Potassium 2.9 12/12/2017 03:18 PM    Chloride 105 12/12/2017 03:18 PM    CO2 26 12/12/2017 03:18 PM    Anion gap 10 12/12/2017 03:18 PM    Glucose 75 12/12/2017 03:18 PM    BUN 9 12/12/2017 03:18 PM    Creatinine 0.99 12/12/2017 03:18 PM    BUN/Creatinine ratio 9 12/12/2017 03:18 PM    GFR est AA >60 12/12/2017 03:18 PM    GFR est non-AA >60 12/12/2017 03:18 PM    Calcium 8.5 12/12/2017 03:18 PM    Bilirubin, total 0.3 12/12/2017 03:18 PM    AST (SGOT) 19 12/12/2017 03:18 PM    Alk. phosphatase 89 12/12/2017 03:18 PM    Protein, total 7.4 12/12/2017 03:18 PM    Albumin 3.5 12/12/2017 03:18 PM    Globulin 3.9 12/12/2017 03:18 PM    A-G Ratio 0.9 12/12/2017 03:18 PM    ALT (SGPT) 39 12/12/2017 03:18 PM           Assessment:     Assessment:     Diagnoses and all orders for this visit:    1. Palpitations  -     AMB POC EKG ROUTINE W/ 12 LEADS, INTER & REP  -     LOOP MONITOR, Clinic Performed; Future    2. Irregular heart beat  -     LOOP MONITOR, Clinic Performed; Future        ICD-10-CM ICD-9-CM    1. Palpitations R00.2 785.1 AMB POC EKG ROUTINE W/ 12 LEADS, INTER & REP      LOOP MONITOR   2.  Irregular heart beat I49.9 427.9 LOOP MONITOR     Orders Placed This Encounter    LOOP MONITOR, Clinic Performed     Standing Status:   Future     Standing Expiration Date:   7/29/2018     Order Specific Question:   Reason for Exam:     Answer:   non daily palpitations/rapid HR occuring more than 4 days apart    AMB POC EKG ROUTINE W/ 12 LEADS, INTER & REP     Order Specific Question:   Reason for Exam:     Answer:   Routine    DISCONTD: fluconazole (DIFLUCAN) 150 mg tablet    promethazine-dextromethorphan (PROMETHAZINE-DM) 6.25-15 mg/5 mL syrup    zolpidem (AMBIEN) 10 mg tablet     Sig: TAKE 1 TABLET BY MOUTH EVERY EVENING AS NEEDED FOR INSOMNIA     Refill:  1    spironolactone (ALDACTONE) 25 mg tablet     Sig: TAKE 1 TABLET BY MOUTH EVERY DAY     Refill:  4    DISCONTD: trimethoprim-sulfamethoxazole (BACTRIM DS, SEPTRA DS) 160-800 mg per tablet     Sig: TAKE 1 TABLET BY MOUTH TWICE A DAY FOR 10 DAYS     Refill:  0    DISCONTD: cefdinir (OMNICEF) 300 mg capsule     Sig: TAKE ONE CAPSULE BY MOUTH EVERY 12 HOURS     Refill:  0    DISCONTD: predniSONE (DELTASONE) 20 mg tablet     Sig: DAYS 1-2 TAKE 3 PILLS DAILY, DAYS 3-4 TAKE 2 PILLS DAILY, DAYS 5-6 TAKE 1 PILL DAILY     Refill:  0        Plan:     Patient presents continued episodes of palpitations/rapid heart rate that occur more than 4 days apart usually in the afternoon with some mild dizziness and rare presyncopal feelings. Has maintained beta-blocker. Will apply event monitor to rule out arrhythmia, previously had intermittent sinus tachycardia 2 years ago. Labs as she was hypokalemic last month in the emergency room. Follow-up when testing complete. Luisana Butler NP    This note was created using voice recognition software. Despite editing, there may be syntax errors. [facility]    1/29/2018         Patient seen, examined by me personally. Plan discussed as detailed. Agree with note as outlined by  NP. I confirm findings in history and physical exam. No additional findings noted. Agree with plan as outlined above.      Seth Paredes MD

## 2018-02-19 ENCOUNTER — TELEPHONE (OUTPATIENT)
Dept: CARDIOLOGY CLINIC | Age: 43
End: 2018-02-19

## 2018-02-19 NOTE — TELEPHONE ENCOUNTER
----- Message from Albaro Schaffer MD sent at 2/17/2018  6:33 AM EST -----  Event monitor normal. thx.

## 2018-03-12 RX ORDER — METOPROLOL SUCCINATE 25 MG/1
TABLET, EXTENDED RELEASE ORAL
Qty: 90 TAB | Refills: 3 | Status: SHIPPED | OUTPATIENT
Start: 2018-03-12 | End: 2019-03-12 | Stop reason: SDUPTHER

## 2018-04-03 RX ORDER — LOSARTAN POTASSIUM 100 MG/1
TABLET ORAL
Qty: 90 TAB | Refills: 3 | Status: SHIPPED | OUTPATIENT
Start: 2018-04-03 | End: 2019-04-13 | Stop reason: SDUPTHER

## 2018-07-11 ENCOUNTER — CLINICAL SUPPORT (OUTPATIENT)
Dept: CARDIOLOGY CLINIC | Age: 43
End: 2018-07-11

## 2018-07-11 ENCOUNTER — OFFICE VISIT (OUTPATIENT)
Dept: CARDIOLOGY CLINIC | Age: 43
End: 2018-07-11

## 2018-07-11 VITALS
DIASTOLIC BLOOD PRESSURE: 80 MMHG | WEIGHT: 189.6 LBS | HEIGHT: 63 IN | OXYGEN SATURATION: 99 % | HEART RATE: 83 BPM | BODY MASS INDEX: 33.59 KG/M2 | SYSTOLIC BLOOD PRESSURE: 114 MMHG

## 2018-07-11 DIAGNOSIS — R00.0 TACHYCARDIA: ICD-10-CM

## 2018-07-11 DIAGNOSIS — R00.2 INTERMITTENT PALPITATIONS: ICD-10-CM

## 2018-07-11 DIAGNOSIS — I49.9 IRREGULAR HEART BEAT: Primary | ICD-10-CM

## 2018-07-11 DIAGNOSIS — I49.9 IRREGULAR HEART BEAT: ICD-10-CM

## 2018-07-11 RX ORDER — ASPIRIN 81 MG/1
TABLET ORAL DAILY
COMMUNITY
End: 2022-03-24

## 2018-07-11 NOTE — PROGRESS NOTES
Craven Cogan DNP, ANP-BC  Subjective/HPI:     Vincent Mccann is a 39 y.o. female is here for symptom based appointment. She is previously been seen for intermittent palpitations has been taking beta-blocker as prescribed. She reports the frequency and intensity of her arrhythmia has exacerbated. She reports in the last few days episodes of rapid heart rate with irregularity. When she called last week I had started patient on enteric-coated aspirin 81 mg for differential diagnosis of paroxysmal atrial fibrillation. She reports since her last visit her potassium level at primary care was 4.0 and she is previously had extensive thyroid workup. In review today she wears an eye watch, looking at her trend today her heart rate ranged from 43- 110 bpm.  She was previously worked up with event monitor and echo, due to atopic dermatitis from the electrodes she was limited in her ability to wear the event monitor for long duration. Denies any syncope or presyncopal feelings. She also reports intermittent right sided chest pain without exertion that coincides with her palpitations.       PCP Provider  Delmy Dunne MD  Past Medical History:   Diagnosis Date    Acid reflux     Anemia     Anxiety     Asthma     no attack in yrs    Headache     Hx of colonoscopy     x 2    Hypercholesterolemia     Hypertension     Irritable bowel syndrome (IBS)     Nausea & vomiting     Other ill-defined conditions(799.89)     hypoglycemia fibromyalgia    Pneumonia 9/13      Past Surgical History:   Procedure Laterality Date    HX APPENDECTOMY  8/15/14    HX CHOLECYSTECTOMY  2007    gallstones pancratitis     HX ENDOSCOPY      x 3    HX GYN      vaginal birth x 1    HX HEENT      lasix    HX HEENT      bilateral eye surgery    HX HEENT      oral surgery    HX HEENT      septal surg     Allergies   Allergen Reactions    Avelox [Moxifloxacin] Other (comments)     Elevated liver enzymes    Erythromycin Nausea and Vomiting    Levaquin [Levofloxacin] Nausea and Vomiting    Zithromax [Azithromycin] Hives      Family History   Problem Relation Age of Onset    Cancer Maternal Grandmother      breast abdomnal    Cancer Paternal Grandmother      panreatic    Headache Father     Headache Sister     Cancer Child      spina bifida      Current Outpatient Prescriptions   Medication Sig    aspirin delayed-release 81 mg tablet Take  by mouth daily.  losartan (COZAAR) 100 mg tablet TAKE 1 TABLET BY MOUTH EVERY DAY    metoprolol succinate (TOPROL-XL) 25 mg XL tablet TAKE 1 TABLET BY MOUTH EVERY DAY    spironolactone (ALDACTONE) 25 mg tablet TAKE 1 TABLET BY MOUTH EVERY DAY    cetirizine (ZYRTEC) 10 mg tablet Take  by mouth daily.  olopatadine (PATANASE) 0.6 % spry 1 Squirt by Both Nostrils route daily.  promethazine (PHENERGAN) 25 mg tablet Take 1 Tab by mouth every six (6) hours as needed for Nausea.  CRYSELLE, 28, 0.3-30 mg-mcg tab 1 Tab daily.  escitalopram oxalate (LEXAPRO) 10 mg tablet Take 1 Tab by mouth daily.  zolpidem (AMBIEN) 5 mg tablet Take 1 Tab by mouth nightly as needed.  RABEprazole (ACIPHEX) 20 mg tablet Take 20 mg by mouth daily.  promethazine-dextromethorphan (PROMETHAZINE-DM) 6.25-15 mg/5 mL syrup     zolpidem (AMBIEN) 10 mg tablet TAKE 1 TABLET BY MOUTH EVERY EVENING AS NEEDED FOR INSOMNIA     No current facility-administered medications for this visit. Vitals:    07/11/18 1521 07/11/18 1535   BP: 116/84 114/80   Pulse: 83    SpO2: 99%    Weight: 189 lb 9.6 oz (86 kg)    Height: 5' 3\" (1.6 m)      Social History     Social History    Marital status:      Spouse name: N/A    Number of children: N/A    Years of education: N/A     Occupational History    Not on file.      Social History Main Topics    Smoking status: Never Smoker    Smokeless tobacco: Never Used    Alcohol use 1.2 - 1.8 oz/week     2 - 3 Glasses of wine per week      Comment: occ    Drug use: No    Sexual activity: Yes     Partners: Male     Other Topics Concern    Not on file     Social History Narrative       I have reviewed the nurses notes, vitals, problem list, allergy list, medical history, family, social history and medications. Review of Symptoms:    General: Pt denies excessive weight gain or loss. Pt is able to conduct ADL's  HEENT: Denies blurred vision, headaches, epistaxis and difficulty swallowing. Respiratory: Denies shortness of breath, SHANNON, wheezing or stridor. Cardiovascular: Denies precordial pain, + palpitations, denies edema or PND  Gastrointestinal: Denies poor appetite, indigestion, abdominal pain or blood in stool  Musculoskeletal: Denies pain or swelling from muscles or joints  Neurologic: Denies tremor, paresthesias, or sensory motor disturbance  Skin: Denies rash, itching or texture change. Physical Exam:      General: Well developed, in no acute distress, cooperative and alert  HEENT: No carotid bruits, no JVD, trach is midline. Neck Supple, PEERL, EOM intact. Heart:  Normal S1/S2 negative S3 or S4. Regular, no murmur, gallop or rub.   Respiratory: Clear bilaterally x 4, no wheezing or rales  Abdomen:   Soft, non-tender, no masses, bowel sounds are active.   Extremities:  No edema, normal cap refill, no cyanosis, atraumatic. Neuro: A&Ox3, speech clear, gait stable. Skin: Skin color is normal. No rashes or lesions.  Non diaphoretic  Vascular: 2+ pulses symmetric in all extremities    Cardiographics    ECG: Sinus rhythm  Results for orders placed or performed during the hospital encounter of 12/12/17   EKG, 12 LEAD, INITIAL   Result Value Ref Range    Ventricular Rate 71 BPM    Atrial Rate 71 BPM    P-R Interval 158 ms    QRS Duration 82 ms    Q-T Interval 404 ms    QTC Calculation (Bezet) 439 ms    Calculated P Axis 52 degrees    Calculated R Axis 19 degrees    Calculated T Axis 41 degrees    Diagnosis       Normal sinus rhythm  When compared with ECG of 15-NOV-2016 10:36,  No significant change was found  Confirmed by Terri Krueger ([id number]) on 12/13/2017 8:43:47 PM           Cardiology Labs:  No results found for: CHOL, CHOLX, CHLST, CHOLV, 142219, HDL, LDL, LDLC, DLDLP, Henry Jordon, [facility], [facility]    Lab Results   Component Value Date/Time    Sodium 141 12/12/2017 03:18 PM    Potassium 2.9 (L) 12/12/2017 03:18 PM    Chloride 105 12/12/2017 03:18 PM    CO2 26 12/12/2017 03:18 PM    Anion gap 10 12/12/2017 03:18 PM    Glucose 75 12/12/2017 03:18 PM    BUN 9 12/12/2017 03:18 PM    Creatinine 0.99 12/12/2017 03:18 PM    BUN/Creatinine ratio 9 (L) 12/12/2017 03:18 PM    GFR est AA >60 12/12/2017 03:18 PM    GFR est non-AA >60 12/12/2017 03:18 PM    Calcium 8.5 12/12/2017 03:18 PM    Bilirubin, total 0.3 12/12/2017 03:18 PM    AST (SGOT) 19 12/12/2017 03:18 PM    Alk. phosphatase 89 12/12/2017 03:18 PM    Protein, total 7.4 12/12/2017 03:18 PM    Albumin 3.5 12/12/2017 03:18 PM    Globulin 3.9 12/12/2017 03:18 PM    A-G Ratio 0.9 (L) 12/12/2017 03:18 PM    ALT (SGPT) 39 12/12/2017 03:18 PM           Assessment:     Assessment:     Diagnoses and all orders for this visit:    1. Irregular heart beat  -     AMB POC EKG ROUTINE W/ 12 LEADS, INTER & REP  -     LOOP MONITOR, Clinic Performed; Future    2. Tachycardia  -     LOOP MONITOR, Clinic Performed; Future    3. Intermittent palpitations  -     LOOP MONITOR, Clinic Performed; Future        ICD-10-CM ICD-9-CM    1. Irregular heart beat I49.9 427.9 AMB POC EKG ROUTINE W/ 12 LEADS, INTER & REP      LOOP MONITOR   2. Tachycardia R00.0 785.0 LOOP MONITOR   3.  Intermittent palpitations R00.2 785.1 LOOP MONITOR     Orders Placed This Encounter    LOOP MONITOR, Clinic Performed     Standing Status:   Future     Standing Expiration Date:   1/11/2019     Order Specific Question:   Reason for Exam:     Answer:   Intermittent palpitations    AMB POC EKG ROUTINE W/ 12 LEADS, INTER & REP     Order Specific Question:   Reason for Exam:     Answer:   ROUTINE    aspirin delayed-release 81 mg tablet     Sig: Take  by mouth daily. Plan:     Patient is a 41-year-old female presenting with intermittent palpitations with associated fluttering and tachycardia that occur more than 4 days apart. On trying to review on her iPhone watch her rate ranged from  bpm today. I will continue for now her current dose of beta-blocker, a event monitor was placed today with instructions after rotating the electrodes she may use topical 1% hydrocortisone with Benadryl to alleviate dermatitis. We discussed an implantable loop recorder or purchasing a alive cor monitoring device which she prefers not to pursue at this time. She also has daytime fatigue, restless legs and wakes up exhausted she has an appointment for sleep medicine consult next month to evaluate for obstructive sleep apnea. Will follow up with patient once the results of the monitor are in. Titus Scheuermann, NP    This note was created using voice recognition software. Despite editing, there may be syntax errors.

## 2018-07-11 NOTE — PROGRESS NOTES
Chief Complaint   Patient presents with    Chest Pain     PT. C/O RT SIDED SHARP CHEST PAIN      1. Have you been to the ER, urgent care clinic since your last visit? Hospitalized since your last visit? NO    2. Have you seen or consulted any other health care providers outside of the [address] [place] [address] since your last visit? Include any pap smears or colon screening. YES, HER NEPHROLOGIST.

## 2018-08-01 ENCOUNTER — TELEPHONE (OUTPATIENT)
Dept: CARDIOLOGY CLINIC | Age: 43
End: 2018-08-01

## 2018-08-01 NOTE — TELEPHONE ENCOUNTER
----- Message from Ronni Henderson MD sent at 7/31/2018 12:21 PM EDT -----  Single PVC's that correlate with patient symptoms.  F/u after all tests. thx.

## 2018-08-03 NOTE — TELEPHONE ENCOUNTER
Verified patient with two identifiers. Pt informed of monitor results. Appt made to discuss further on sept 5 with Dr Reyes Becker. Pt verbalized understanding.

## 2018-09-05 ENCOUNTER — OFFICE VISIT (OUTPATIENT)
Dept: CARDIOLOGY CLINIC | Age: 43
End: 2018-09-05

## 2018-09-05 VITALS
BODY MASS INDEX: 33.82 KG/M2 | RESPIRATION RATE: 16 BRPM | HEART RATE: 78 BPM | SYSTOLIC BLOOD PRESSURE: 120 MMHG | DIASTOLIC BLOOD PRESSURE: 84 MMHG | OXYGEN SATURATION: 97 % | WEIGHT: 190.9 LBS | HEIGHT: 63 IN

## 2018-09-05 DIAGNOSIS — I49.9 IRREGULAR HEART BEAT: ICD-10-CM

## 2018-09-05 DIAGNOSIS — I10 ESSENTIAL HYPERTENSION: ICD-10-CM

## 2018-09-05 DIAGNOSIS — G47.33 OSA (OBSTRUCTIVE SLEEP APNEA): ICD-10-CM

## 2018-09-05 DIAGNOSIS — R00.2 PALPITATION: Primary | ICD-10-CM

## 2018-09-05 NOTE — PROGRESS NOTES
1. Have you been to the ER, urgent care clinic since your last visit? Hospitalized since your last visit? No.    2. Have you seen or consulted any other health care providers outside of the [address] [place] [address] since your last visit? Include any pap smears or colon screening. [facility] for sleep study and pt has records with her.       Chief Complaint   Patient presents with    Results     here to discuss monitor results-has sleep study results with her-1 episode of heart fluttering that did not last long

## 2018-09-05 NOTE — PROGRESS NOTES
[address] [address]  [phone]     Subjective:      Corey Molina is a 39 y.o. female is here for routine f/u. Reports improved palpitations/fatigue, now on CPAP therapy. The patient denies chest pain/ shortness of breath, orthopnea, PND, LE edema, syncope, or presyncope.        Patient Active Problem List    Diagnosis Date Noted    Syncope and collapse 10/03/2016    Fibromyalgia 10/03/2016    Migraine with aura and without status migrainosus, not intractable 10/03/2016    Cervical spondylitis with radiculitis ([facility] [address] 10/03/2016    Cervical radiculopathy due to degenerative joint disease of spine 10/03/2016    Carpal tunnel syndrome on both sides 10/03/2016    Stenosis of both internal carotid arteries 10/03/2016    Irregular heart beat 07/14/2016    Left upper quadrant pain 06/17/2015    Acute appendicitis 08/15/2014    Depressive disorder, not elsewhere classified 04/18/2013      Cole Beltran MD  Past Medical History:   Diagnosis Date    Acid reflux     Anemia     Anxiety     Asthma     no attack in yrs    Headache     Hx of colonoscopy     x 2    Hypercholesterolemia     Hypertension     Irritable bowel syndrome (IBS)     Nausea & vomiting     Other ill-defined conditions(799.89)     hypoglycemia fibromyalgia    Pneumonia 9/13      Past Surgical History:   Procedure Laterality Date    HX APPENDECTOMY  8/15/14    HX CHOLECYSTECTOMY  2007    gallstones pancratitis     HX ENDOSCOPY      x 3    HX GYN      vaginal birth x 1    HX HEENT      lasix    HX HEENT      bilateral eye surgery    HX HEENT      oral surgery    HX HEENT      septal surg     Allergies   Allergen Reactions    Avelox [Moxifloxacin] Other (comments)     Elevated liver enzymes    Erythromycin Nausea and Vomiting    Levaquin [Levofloxacin] Nausea and Vomiting    Zithromax [Azithromycin] Hives      Family History   Problem Relation Age of Onset    Cancer Maternal Grandmother      breast abdomnal    Cancer Paternal Grandmother      panreatic    Headache Father     Headache Sister     Cancer Child      spina bifida      Social History     Social History    Marital status:      Spouse name: N/A    Number of children: N/A    Years of education: N/A     Occupational History    Not on file. Social History Main Topics    Smoking status: Never Smoker    Smokeless tobacco: Never Used    Alcohol use 1.2 - 1.8 oz/week     2 - 3 Glasses of wine per week      Comment: occ    Drug use: No    Sexual activity: Yes     Partners: Male     Other Topics Concern    Not on file     Social History Narrative      Current Outpatient Prescriptions   Medication Sig    aspirin delayed-release 81 mg tablet Take  by mouth daily.  losartan (COZAAR) 100 mg tablet TAKE 1 TABLET BY MOUTH EVERY DAY    metoprolol succinate (TOPROL-XL) 25 mg XL tablet TAKE 1 TABLET BY MOUTH EVERY DAY    promethazine-dextromethorphan (PROMETHAZINE-DM) 6.25-15 mg/5 mL syrup     zolpidem (AMBIEN) 10 mg tablet TAKE 1 TABLET BY MOUTH EVERY EVENING AS NEEDED FOR INSOMNIA    spironolactone (ALDACTONE) 25 mg tablet TAKE 1 TABLET BY MOUTH EVERY DAY    cetirizine (ZYRTEC) 10 mg tablet Take  by mouth daily.  promethazine (PHENERGAN) 25 mg tablet Take 1 Tab by mouth every six (6) hours as needed for Nausea.  CRYSELLE, 28, 0.3-30 mg-mcg tab 1 Tab daily.  escitalopram oxalate (LEXAPRO) 10 mg tablet Take 1 Tab by mouth daily.  RABEprazole (ACIPHEX) 20 mg tablet Take 20 mg by mouth daily.  olopatadine (PATANASE) 0.6 % spry 1 Squirt by Both Nostrils route daily. No current facility-administered medications for this visit.           Review of Symptoms:  11 systems reviewed, negative other than as stated in the HPI    Physical ExamPhysical Exam:    Vitals:    09/05/18 1021 09/05/18 1033   BP: 124/84 126/90   Pulse: 78    Resp: 16    SpO2: 97%    Weight: 190 lb 14.4 oz (86.6 kg) Height: 5' 3\" (1.6 m)      Body mass index is 33.82 kg/(m^2). General PE   Gen:  NAD  Mental Status - Alert. General Appearance - Not in acute distress. Chest and Lung Exam   Inspection: Accessory muscles - No use of accessory muscles in breathing. Auscultation:   Breath sounds: - Normal.   Cardiovascular   Inspection: Jugular vein - Bilateral - Inspection Normal.   Palpation/Percussion:   Apical Impulse: - Normal.   Auscultation: Rhythm - Regular. Heart Sounds - S1 WNL and S2 WNL. No S3 or S4. Murmurs & Other Heart Sounds: Auscultation of the heart reveals - No Murmurs. Peripheral Vascular   Upper Extremity: Inspection - Bilateral - No Cyanotic nailbeds or Digital clubbing. Lower Extremity:   Palpation: Edema - Bilateral - No edema. Abdomen:   Soft, non-tender, bowel sounds are active. Neuro: A&O times 3, CN and motor grossly WNL    Labs:   No results found for: CHOL, CHOLX, CHLST, CHOLV, 582101, HDL, LDL, LDLC, DLDLP, Gloria Bird, HD, [facility]  Lab Results   Component Value Date/Time    CK 36 11/15/2016 02:06 PM     Lab Results   Component Value Date/Time    Sodium 141 12/12/2017 03:18 PM    Potassium 2.9 (L) 12/12/2017 03:18 PM    Chloride 105 12/12/2017 03:18 PM    CO2 26 12/12/2017 03:18 PM    Anion gap 10 12/12/2017 03:18 PM    Glucose 75 12/12/2017 03:18 PM    BUN 9 12/12/2017 03:18 PM    Creatinine 0.99 12/12/2017 03:18 PM    BUN/Creatinine ratio 9 (L) 12/12/2017 03:18 PM    GFR est AA >60 12/12/2017 03:18 PM    GFR est non-AA >60 12/12/2017 03:18 PM    Calcium 8.5 12/12/2017 03:18 PM    Bilirubin, total 0.3 12/12/2017 03:18 PM    AST (SGOT) 19 12/12/2017 03:18 PM    Alk. phosphatase 89 12/12/2017 03:18 PM    Protein, total 7.4 12/12/2017 03:18 PM    Albumin 3.5 12/12/2017 03:18 PM    Globulin 3.9 12/12/2017 03:18 PM    A-G Ratio 0.9 (L) 12/12/2017 03:18 PM    ALT (SGPT) 39 12/12/2017 03:18 PM       EKG:       Assessment:     Assessment:      1. Palpitation    2.  Irregular heart beat 3. SEMAJ (obstructive sleep apnea)        No orders of the defined types were placed in this encounter. Plan:     Pt presents for f/u. Palpitations  Event monitor 7/18 showed single PVCs  Echo normal EF, Mitral valve showed mild prolapse involving the anterior leaflet. There was mild regurgitation per echo 07/16  Improved since initiation of CPAP therapy  On BB  Consider repeat echo next year. HTN  BP controlled with current therapy      SEMAJ  Now on CPAP therapy  Report feeling better / more energy      Lipids and labs followed by PCP. Continue current care and f/u in 6 months. Irene Cazares NP       [facility]    9/5/2018         Patient seen, examined by me personally. Plan discussed as detailed. Agree with note as outlined by  NP. I confirm findings in history and physical exam. No additional findings noted. Agree with plan as outlined above.      Aleshia Baker MD

## 2018-09-05 NOTE — MR AVS SNAPSHOT
[address] [facility] 
[phone] Patient: Elli Rey MRN:  [id number] Visit Information Date & Time Provider Department Dept. Phone Encounter #  
 9/5/2018 10:30 AM Richar Kaylee Carrera, [address] [place] [address] [facility] [phone] [id number] Your Appointments 3/28/2019  1:45 PM  
ESTABLISHED PATIENT with MD [facility] Dr Willie Stephens  
 [id number] [facility]  
[phone] [address] [facility] Upcoming Health Maintenance Date Due DTaP/Tdap/Td series (1 - Tdap) 4/19/1995 PAP AKA CERVICAL CYTOLOGY 4/19/1995 Influenza Age 5 to Adult 8/1/2018 Allergies as of 9/5/2018  Review Complete On: 9/5/2018 By: Marti Thorpe LPN Severity Noted Reaction Type Reactions Avelox [Moxifloxacin] High 08/21/2011   Systemic Other (comments) Elevated liver enzymes Erythromycin Medium 04/12/2010   Intolerance Nausea and Vomiting Levaquin [Levofloxacin] Medium 04/12/2010   Intolerance Nausea and Vomiting Zithromax [Azithromycin] Medium 04/12/2010   Side Effect Hives Current Immunizations  Reviewed on 11/15/2016 No immunizations on file. Not reviewed this visit You Were Diagnosed With   
  
 Codes Comments Palpitation    -  Primary ICD-10-CM: R00.2 ICD-9-CM: 785.1 Irregular heart beat     ICD-10-CM: I49.9 ICD-9-CM: 427.9 SEMAJ (obstructive sleep apnea)     ICD-10-CM: G47.33 
ICD-9-CM: 327.23 Essential hypertension     ICD-10-CM: I10 
ICD-9-CM: 401.9 Vitals BP Pulse Resp Height(growth percentile) Weight(growth percentile) SpO2  
 120/84 78 16 5' 3\" (1.6 m) 190 lb 14.4 oz (86.6 kg) 97% BMI OB Status Smoking Status 33.82 kg/m2 Having regular periods Never Smoker Vitals History BMI and BSA Data Body Mass Index Body Surface Area  
 33.82 kg/m 2 1.96 m 2 Preferred Pharmacy Pharmacy Name Phone [id number], [address] [phone] Your Updated Medication List  
  
   
This list is accurate as of 9/5/18 11:03 AM.  Always use your most recent med list.  
  
  
  
  
 ACIPHEX 20 mg tablet Generic drug:  RABEprazole Take 20 mg by mouth daily. aspirin delayed-release 81 mg tablet Take  by mouth daily. CRYSELLE (28) 0.3-30 mg-mcg Tab Generic drug:  norgestrel-ethinyl estradiol 1 Tab daily. escitalopram oxalate 10 mg tablet Commonly known as:  Pollyann Fauquier Take 1 Tab by mouth daily. losartan 100 mg tablet Commonly known as:  COZAAR  
TAKE 1 TABLET BY MOUTH EVERY DAY  
  
 metoprolol succinate 25 mg XL tablet Commonly known as:  TOPROL-XL  
TAKE 1 TABLET BY MOUTH EVERY DAY  
  
 olopatadine 0.6 % Spry Commonly known as:  PATANASE  
1 Squirt by Both Nostrils route daily. promethazine 25 mg tablet Commonly known as:  PHENERGAN Take 1 Tab by mouth every six (6) hours as needed for Nausea. promethazine-dextromethorphan 6.25-15 mg/5 mL syrup Commonly known as:  PROMETHAZINE-DM  
  
 spironolactone 25 mg tablet Commonly known as:  ALDACTONE  
TAKE 1 TABLET BY MOUTH EVERY DAY  
  
 zolpidem 10 mg tablet Commonly known as:  AMBIEN  
TAKE 1 TABLET BY MOUTH EVERY EVENING AS NEEDED FOR INSOMNIA ZyrTEC 10 mg tablet Generic drug:  cetirizine Take  by mouth daily. Introducing [facility]! Dear Anthony Sierra: Thank you for requesting a [employer] account. Our records indicate that you already have an active [employer] account. You can access your account anytime at [email or web address]. [email or web address] Did you know that you can access your hospital and ER discharge instructions at any time in [employer]? You can also review all of your test results from your hospital stay or ER visit. Additional Information If you have questions, please visit the Frequently Asked Questions section of the [employer] website at [email or web address] Remember, [employer] is NOT to be used for urgent needs. For medical emergencies, dial 911. Now available from your iPhone and Android! Please provide this summary of care documentation to your next provider. Your primary care clinician is listed as Ophelia Villareal. If you have any questions after today's visit, please call [phone].

## 2019-03-11 RX ORDER — METOPROLOL SUCCINATE 25 MG/1
25 TABLET, EXTENDED RELEASE ORAL DAILY
Qty: 90 TAB | Refills: 3 | Status: CANCELLED | OUTPATIENT
Start: 2019-03-11

## 2019-03-13 RX ORDER — METOPROLOL SUCCINATE 25 MG/1
TABLET, EXTENDED RELEASE ORAL
Qty: 90 TAB | Refills: 3 | Status: SHIPPED | OUTPATIENT
Start: 2019-03-13 | End: 2020-03-02

## 2019-03-14 ENCOUNTER — OFFICE VISIT (OUTPATIENT)
Dept: CARDIOLOGY CLINIC | Age: 44
End: 2019-03-14

## 2019-03-14 VITALS
SYSTOLIC BLOOD PRESSURE: 116 MMHG | BODY MASS INDEX: 35.77 KG/M2 | DIASTOLIC BLOOD PRESSURE: 84 MMHG | OXYGEN SATURATION: 95 % | HEART RATE: 94 BPM | WEIGHT: 201.9 LBS | RESPIRATION RATE: 20 BRPM | HEIGHT: 63 IN

## 2019-03-14 DIAGNOSIS — I10 ESSENTIAL HYPERTENSION: Primary | ICD-10-CM

## 2019-03-14 DIAGNOSIS — R00.2 PALPITATION: ICD-10-CM

## 2019-03-14 DIAGNOSIS — G47.33 OSA (OBSTRUCTIVE SLEEP APNEA): ICD-10-CM

## 2019-03-14 PROBLEM — E66.01 SEVERE OBESITY (HCC): Status: ACTIVE | Noted: 2019-03-14

## 2019-03-14 RX ORDER — PREDNISONE 20 MG/1
TABLET ORAL
Refills: 0 | COMMUNITY
Start: 2019-02-27 | End: 2019-03-14 | Stop reason: ALTCHOICE

## 2019-03-14 RX ORDER — GLYCOPYRROLATE 1 MG/1
TABLET ORAL
COMMUNITY
End: 2019-03-14

## 2019-03-14 NOTE — PROGRESS NOTES
1. Have you been to the ER, urgent care clinic since your last visit? Hospitalized since your last visit? No    2. Have you seen or consulted any other health care providers outside of the [address] [place] [address] since your last visit? Include any pap smears or colon screening.  No     Chief Complaint   Patient presents with    Palpitations     6 mo f/u; pt reports still comes and goes     Hypertension     6 mo f/u; pt repors high bp reading 155/104 x 4 days ago    Hand Swelling     mild swelling to hands bilaterally

## 2019-03-14 NOTE — PROGRESS NOTES
[address] [facility] [place], [facility] [address] [facility]  [phone]     Subjective:      Papi Ibarra is a 39 y.o. female is here for routine f/u. Reports rare episodes of palpitation. The patient denies chest pain/ shortness of breath, orthopnea, PND, LE edema, syncope, or presyncope.        Patient Active Problem List    Diagnosis Date Noted    Severe obesity ([facility] [address] 03/14/2019    Syncope and collapse 10/03/2016    Fibromyalgia 10/03/2016    Migraine with aura and without status migrainosus, not intractable 10/03/2016    Cervical spondylitis with radiculitis ([facility] 10/03/2016    Cervical radiculopathy due to degenerative joint disease of spine 10/03/2016    Carpal tunnel syndrome on both sides 10/03/2016    Stenosis of both internal carotid arteries 10/03/2016    Irregular heart beat 07/14/2016    Left upper quadrant pain 06/17/2015    Acute appendicitis 08/15/2014    Depressive disorder, not elsewhere classified 04/18/2013      Clovis Larios MD  Past Medical History:   Diagnosis Date    Acid reflux     Anemia     Anxiety     Asthma     no attack in yrs    Headache     Hx of colonoscopy     x 2    Hypercholesterolemia     Hypertension     Irritable bowel syndrome (IBS)     Nausea & vomiting     Other ill-defined conditions(799.89)     hypoglycemia fibromyalgia    Pneumonia 9/13      Past Surgical History:   Procedure Laterality Date    HX APPENDECTOMY  8/15/14    HX CHOLECYSTECTOMY  2007    gallstones pancratitis     HX ENDOSCOPY      x 3    HX GYN      vaginal birth x 1    HX HEENT      lasix    HX HEENT      bilateral eye surgery    HX HEENT      oral surgery    HX HEENT      septal surg     Allergies   Allergen Reactions    Avelox [Moxifloxacin] Other (comments)     Elevated liver enzymes    Erythromycin Nausea and Vomiting    Levaquin [Levofloxacin] Nausea and Vomiting    Zithromax [Azithromycin] Hives      Family History   Problem Relation Age of Onset    Cancer Maternal Grandmother         breast abdomnal    Cancer Paternal Grandmother         panreatic    Headache Father     Headache Sister     Cancer Child         spina bifida      Social History     Socioeconomic History    Marital status:      Spouse name: Not on file    Number of children: Not on file    Years of education: Not on file    Highest education level: Not on file   Social Needs    Financial resource strain: Not on file    Food insecurity - worry: Not on file    Food insecurity - inability: Not on file   [employer] needs - medical: Not on file   [employer] needs - non-medical: Not on file   Occupational History    Not on file   Tobacco Use    Smoking status: Never Smoker    Smokeless tobacco: Never Used   Substance and Sexual Activity    Alcohol use: Yes     Alcohol/week: 1.2 - 1.8 oz     Types: 2 - 3 Glasses of wine per week     Comment: occ    Drug use: No    Sexual activity: Yes     Partners: Male   Other Topics Concern    Not on file   Social History Narrative    Not on file      Current Outpatient Medications   Medication Sig    metoprolol succinate (TOPROL-XL) 25 mg XL tablet TAKE 1 TABLET BY MOUTH EVERY DAY    aspirin delayed-release 81 mg tablet Take  by mouth daily.  losartan (COZAAR) 100 mg tablet TAKE 1 TABLET BY MOUTH EVERY DAY    zolpidem (AMBIEN) 10 mg tablet TAKE ONE-HALF TABLET BY MOUTH EVERY EVENING AS NEEDED FOR INSOMNIA    spironolactone (ALDACTONE) 25 mg tablet TAKE 1 TABLET BY MOUTH EVERY DAY    cetirizine (ZYRTEC) 10 mg tablet Take  by mouth daily.  promethazine (PHENERGAN) 25 mg tablet Take 1 Tab by mouth every six (6) hours as needed for Nausea.  CRYSELLE, 28, 0.3-30 mg-mcg tab 1 Tab daily.  escitalopram oxalate (LEXAPRO) 10 mg tablet Take 1 Tab by mouth daily.  RABEprazole (ACIPHEX) 20 mg tablet Take 20 mg by mouth daily.     glycopyrrolate (ROBINUL) 1 mg tablet glycopyrrolate 1 mg tablet    promethazine-dextromethorphan (PROMETHAZINE-DM) 6.25-15 mg/5 mL syrup     olopatadine (PATANASE) 0.6 % spry 1 Squirt by Both Nostrils route daily. No current facility-administered medications for this visit. Review of Symptoms:  11 systems reviewed, negative other than as stated in the HPI    Physical ExamPhysical Exam:    Vitals:    03/14/19 1500 03/14/19 1513   BP: 122/88 116/84   Pulse: 94    Resp: 20    SpO2: 95%    Weight: 201 lb 14.4 oz (91.6 kg)    Height: 5' 3\" (1.6 m)      Body mass index is 35.76 kg/m². General PE   Gen:  NAD  Mental Status - Alert. General Appearance - Not in acute distress. Chest and Lung Exam   Inspection: Accessory muscles - No use of accessory muscles in breathing. Auscultation:   Breath sounds: - Normal.   Cardiovascular   Inspection: Jugular vein - Bilateral - Inspection Normal.   Palpation/Percussion:   Apical Impulse: - Normal.   Auscultation: Rhythm - Regular. Heart Sounds - S1 WNL and S2 WNL. No S3 or S4. Murmurs & Other Heart Sounds: Auscultation of the heart reveals - No Murmurs. Peripheral Vascular   Upper Extremity: Inspection - Bilateral - No Cyanotic nailbeds or Digital clubbing. Lower Extremity:   Palpation: Edema - Bilateral - No edema. Abdomen:   Soft, non-tender, bowel sounds are active.   Neuro: A&O times 3, CN and motor grossly WNL    Labs:   No results found for: CHOL, CHOLX, CHLST, CHOLV, 502082, HDL, LDL, LDLC, DLDLP, TGLX, Brenda Gracie, [facility], [facility]  Lab Results   Component Value Date/Time    CK 36 11/15/2016 02:06 PM     Lab Results   Component Value Date/Time    Sodium 141 12/12/2017 03:18 PM    Potassium 2.9 (L) 12/12/2017 03:18 PM    Chloride 105 12/12/2017 03:18 PM    CO2 26 12/12/2017 03:18 PM    Anion gap 10 12/12/2017 03:18 PM    Glucose 75 12/12/2017 03:18 PM    BUN 9 12/12/2017 03:18 PM    Creatinine 0.99 12/12/2017 03:18 PM    BUN/Creatinine ratio 9 (L) 12/12/2017 03:18 PM    GFR est AA >60 12/12/2017 03:18 PM    GFR est non-AA >60 12/12/2017 03:18 PM    Calcium 8.5 12/12/2017 03:18 PM    Bilirubin, total 0.3 12/12/2017 03:18 PM    AST (SGOT) 19 12/12/2017 03:18 PM    Alk. phosphatase 89 12/12/2017 03:18 PM    Protein, total 7.4 12/12/2017 03:18 PM    Albumin 3.5 12/12/2017 03:18 PM    Globulin 3.9 12/12/2017 03:18 PM    A-G Ratio 0.9 (L) 12/12/2017 03:18 PM    ALT (SGPT) 39 12/12/2017 03:18 PM       EKG:  NSR      Assessment:     Assessment:      1. Essential hypertension    2. Palpitation    3. SEMAJ (obstructive sleep apnea)    4. BMI 35.0-35.9,adult        Orders Placed This Encounter    AMB POC EKG ROUTINE W/ 12 LEADS, INTER & REP     Order Specific Question:   Reason for Exam:     Answer:   Routine    glycopyrrolate (ROBINUL) 1 mg tablet     Sig: glycopyrrolate 1 mg tablet    DISCONTD: predniSONE (DELTASONE) 20 mg tablet     Sig: TAKE 3 TABLETS BY MOUTH EVERY DAY FOR 5 DAYS     Refill:  0        Plan:      Pt presents for f/u, doing well and stable from cardiac standpoint. Reports rare episodes of palpitation.       Palpitations  Event monitor 7/18 showed single PVCs  Echo normal EF, Mitral valve showed mild prolapse involving the anterior leaflet. There was mild regurgitation per echo 07/16  On BB  Repeat echo      HTN  BP controlled with current therapy     SEMAJ  On CPAP therapy     Lipids and labs followed by PCP.       Continue current care and f/u in 1 yr    Doroteo Castro NP       [facility] Cardiology    3/19/2019         Patient seen, examined by me personally. Plan discussed as detailed. Agree with note as outlined by  NP. I confirm findings in history and physical exam. No additional findings noted. Agree with plan as outlined above.      Ronda Fuller MD

## 2019-04-14 RX ORDER — LOSARTAN POTASSIUM 100 MG/1
TABLET ORAL
Qty: 90 TAB | Refills: 3 | Status: SHIPPED | OUTPATIENT
Start: 2019-04-14 | End: 2020-04-02

## 2019-08-13 ENCOUNTER — HOSPITAL ENCOUNTER (EMERGENCY)
Age: 44
Discharge: HOME | End: 2019-08-13
Attending: EMERGENCY MEDICINE | Admitting: EMERGENCY MEDICINE
Payer: COMMERCIAL

## 2019-08-13 ENCOUNTER — APPOINTMENT (OUTPATIENT)
Dept: CT IMAGING | Age: 44
End: 2019-08-13
Attending: EMERGENCY MEDICINE
Payer: COMMERCIAL

## 2019-08-13 VITALS
RESPIRATION RATE: 16 BRPM | OXYGEN SATURATION: 100 % | HEART RATE: 81 BPM | DIASTOLIC BLOOD PRESSURE: 66 MMHG | TEMPERATURE: 97.3 F | HEIGHT: 63 IN | WEIGHT: 197.53 LBS | SYSTOLIC BLOOD PRESSURE: 122 MMHG | BODY MASS INDEX: 35 KG/M2

## 2019-08-13 DIAGNOSIS — K50.00 TERMINAL ILEITIS WITHOUT COMPLICATION (HCC): Primary | ICD-10-CM

## 2019-08-13 LAB
ALBUMIN SERPL-MCNC: 3.3 G/DL (ref 3.5–5)
ALBUMIN/GLOB SERPL: 0.9 {RATIO} (ref 1.1–2.2)
ALP SERPL-CCNC: 76 U/L (ref 45–117)
ALT SERPL-CCNC: 68 U/L (ref 12–78)
ANION GAP SERPL CALC-SCNC: 10 MMOL/L (ref 5–15)
APPEARANCE UR: CLEAR
AST SERPL-CCNC: 41 U/L (ref 15–37)
BACTERIA URNS QL MICRO: NEGATIVE /HPF
BASOPHILS # BLD: 0 K/UL (ref 0–0.1)
BASOPHILS NFR BLD: 1 % (ref 0–1)
BILIRUB SERPL-MCNC: 0.4 MG/DL (ref 0.2–1)
BILIRUB UR QL: NEGATIVE
BUN SERPL-MCNC: 8 MG/DL (ref 6–20)
BUN/CREAT SERPL: 9 (ref 12–20)
CALCIUM SERPL-MCNC: 8.6 MG/DL (ref 8.5–10.1)
CHLORIDE SERPL-SCNC: 108 MMOL/L (ref 97–108)
CO2 SERPL-SCNC: 22 MMOL/L (ref 21–32)
COLOR UR: ABNORMAL
CREAT SERPL-MCNC: 0.86 MG/DL (ref 0.55–1.02)
DIFFERENTIAL METHOD BLD: NORMAL
EOSINOPHIL # BLD: 0.1 K/UL (ref 0–0.4)
EOSINOPHIL NFR BLD: 2 % (ref 0–7)
EPITH CASTS URNS QL MICRO: ABNORMAL /LPF
ERYTHROCYTE [DISTWIDTH] IN BLOOD BY AUTOMATED COUNT: 13.9 % (ref 11.5–14.5)
GLOBULIN SER CALC-MCNC: 3.5 G/DL (ref 2–4)
GLUCOSE SERPL-MCNC: 97 MG/DL (ref 65–100)
GLUCOSE UR STRIP.AUTO-MCNC: NEGATIVE MG/DL
HCT VFR BLD AUTO: 38.7 % (ref 35–47)
HGB BLD-MCNC: 12.6 G/DL (ref 11.5–16)
HGB UR QL STRIP: NEGATIVE
HYALINE CASTS URNS QL MICRO: ABNORMAL /LPF (ref 0–5)
IMM GRANULOCYTES # BLD AUTO: 0 K/UL (ref 0–0.04)
IMM GRANULOCYTES NFR BLD AUTO: 0 % (ref 0–0.5)
KETONES UR QL STRIP.AUTO: NEGATIVE MG/DL
LEUKOCYTE ESTERASE UR QL STRIP.AUTO: ABNORMAL
LIPASE SERPL-CCNC: 217 U/L (ref 73–393)
LYMPHOCYTES # BLD: 1.7 K/UL (ref 0.8–3.5)
LYMPHOCYTES NFR BLD: 28 % (ref 12–49)
MCH RBC QN AUTO: 26.7 PG (ref 26–34)
MCHC RBC AUTO-ENTMCNC: 32.6 G/DL (ref 30–36.5)
MCV RBC AUTO: 82 FL (ref 80–99)
MONOCYTES # BLD: 0.4 K/UL (ref 0–1)
MONOCYTES NFR BLD: 6 % (ref 5–13)
NEUTS SEG # BLD: 3.8 K/UL (ref 1.8–8)
NEUTS SEG NFR BLD: 63 % (ref 32–75)
NITRITE UR QL STRIP.AUTO: NEGATIVE
NRBC # BLD: 0 K/UL (ref 0–0.01)
NRBC BLD-RTO: 0 PER 100 WBC
PH UR STRIP: 6 [PH] (ref 5–8)
PLATELET # BLD AUTO: 277 K/UL (ref 150–400)
PMV BLD AUTO: 9.5 FL (ref 8.9–12.9)
POTASSIUM SERPL-SCNC: 3.4 MMOL/L (ref 3.5–5.1)
PROT SERPL-MCNC: 6.8 G/DL (ref 6.4–8.2)
PROT UR STRIP-MCNC: NEGATIVE MG/DL
RBC # BLD AUTO: 4.72 M/UL (ref 3.8–5.2)
RBC #/AREA URNS HPF: ABNORMAL /HPF (ref 0–5)
SODIUM SERPL-SCNC: 140 MMOL/L (ref 136–145)
SP GR UR REFRACTOMETRY: 1.01 (ref 1–1.03)
UA: UC IF INDICATED,UAUC: ABNORMAL
UROBILINOGEN UR QL STRIP.AUTO: 0.2 EU/DL (ref 0.2–1)
WBC # BLD AUTO: 6 K/UL (ref 3.6–11)
WBC URNS QL MICRO: ABNORMAL /HPF (ref 0–4)

## 2019-08-13 PROCEDURE — 96374 THER/PROPH/DIAG INJ IV PUSH: CPT

## 2019-08-13 PROCEDURE — 99284 EMERGENCY DEPT VISIT MOD MDM: CPT

## 2019-08-13 PROCEDURE — 85025 COMPLETE CBC W/AUTO DIFF WBC: CPT

## 2019-08-13 PROCEDURE — 74011636320 HC RX REV CODE- 636/320: Performed by: EMERGENCY MEDICINE

## 2019-08-13 PROCEDURE — 87086 URINE CULTURE/COLONY COUNT: CPT

## 2019-08-13 PROCEDURE — 74011250636 HC RX REV CODE- 250/636

## 2019-08-13 PROCEDURE — 81001 URINALYSIS AUTO W/SCOPE: CPT

## 2019-08-13 PROCEDURE — 36415 COLL VENOUS BLD VENIPUNCTURE: CPT

## 2019-08-13 PROCEDURE — 74177 CT ABD & PELVIS W/CONTRAST: CPT

## 2019-08-13 PROCEDURE — 74011250636 HC RX REV CODE- 250/636: Performed by: EMERGENCY MEDICINE

## 2019-08-13 PROCEDURE — 80053 COMPREHEN METABOLIC PANEL: CPT

## 2019-08-13 PROCEDURE — 96375 TX/PRO/DX INJ NEW DRUG ADDON: CPT

## 2019-08-13 PROCEDURE — 83690 ASSAY OF LIPASE: CPT

## 2019-08-13 RX ORDER — ONDANSETRON 2 MG/ML
4 INJECTION INTRAMUSCULAR; INTRAVENOUS
Status: COMPLETED | OUTPATIENT
Start: 2019-08-13 | End: 2019-08-13

## 2019-08-13 RX ORDER — SODIUM CHLORIDE 0.9 % (FLUSH) 0.9 %
10 SYRINGE (ML) INJECTION
Status: COMPLETED | OUTPATIENT
Start: 2019-08-13 | End: 2019-08-13

## 2019-08-13 RX ORDER — ONDANSETRON 4 MG/1
4 TABLET, ORALLY DISINTEGRATING ORAL
Qty: 10 TAB | Refills: 0 | Status: SHIPPED | OUTPATIENT
Start: 2019-08-13

## 2019-08-13 RX ORDER — AMOXICILLIN AND CLAVULANATE POTASSIUM 875; 125 MG/1; MG/1
1 TABLET, FILM COATED ORAL 2 TIMES DAILY
Qty: 10 TAB | Refills: 0 | Status: SHIPPED | OUTPATIENT
Start: 2019-08-13 | End: 2019-08-18

## 2019-08-13 RX ORDER — KETOROLAC TROMETHAMINE 30 MG/ML
15 INJECTION, SOLUTION INTRAMUSCULAR; INTRAVENOUS
Status: COMPLETED | OUTPATIENT
Start: 2019-08-13 | End: 2019-08-13

## 2019-08-13 RX ORDER — ONDANSETRON 2 MG/ML
INJECTION INTRAMUSCULAR; INTRAVENOUS
Status: COMPLETED
Start: 2019-08-13 | End: 2019-08-13

## 2019-08-13 RX ADMIN — IOPAMIDOL 100 ML: 755 INJECTION, SOLUTION INTRAVENOUS at 09:45

## 2019-08-13 RX ADMIN — ONDANSETRON 4 MG: 2 INJECTION INTRAMUSCULAR; INTRAVENOUS at 08:45

## 2019-08-13 RX ADMIN — Medication 10 ML: at 09:45

## 2019-08-13 RX ADMIN — KETOROLAC TROMETHAMINE 15 MG: 30 INJECTION, SOLUTION INTRAMUSCULAR at 08:44

## 2019-08-13 NOTE — DISCHARGE INSTRUCTIONS
Patient Education        Diet for Inflammatory Bowel Disease: Care Instructions  Your Care Instructions    Crohn's disease and ulcerative colitis are types of inflammatory bowel disease. What you eat doesn't increase the inflammation that causes your disease. But some types of foods, such as high-fiber fruits and vegetables, may make your symptoms worse. No one diet is right for everyone with an inflammatory bowel disease. Foods that bother one person may not bother another. Your diet has to be tailored for you. Follow-up care is a key part of your treatment and safety. Be sure to make and go to all appointments, and call your doctor if you are having problems. It's also a good idea to know your test results and keep a list of the medicines you take. How can you care for yourself at home? · Keep a food diary. As soon as you know what foods make your symptoms worse, your doctor or dietitian can help you plan the right diet for you. · During a flare-up, avoid or reduce foods that make symptoms worse. ? Choose dairy products that are low in lactose, such as yogurt, lactose-reduced milk, and hard cheeses like cheddar. ? If you have fat in your stools, choose low-fat foods instead of high-fat ones. For instance, some cuts of red meat have a lot of fat. A low-fat choice would be lean beef (such as sirloin, top and bottom round, indigo, or diet lean hamburger), poultry, or fish, such as cod.  ? Instead of frying foods, try baking or broiling them. ? Cook fruits and vegetables without hulls, skins, or seeds. ? Try different ways of preparing fruits and vegetables, such as steaming, stewing, or baking. ? Peel and seed fresh fruits and vegetables if these bother you, or choose canned varieties. · Get the calories and nutrients you need. ? Eat a varied, nutritious diet that is high in calories and protein. ? Try eating 3 meals plus 2 or 3 snacks a day.  It may be easier to get more calories if you spread your food intake throughout the day. ? Take vitamin and mineral supplements if your doctor recommends them. ? Try adding high-calorie liquid supplements, such as Ensure Plus or Boost Plus, if you have trouble keeping your weight up.  ? See your doctor or dietitian if your diet feels too limited or you are losing weight. · Make sure to get enough iron. Rectal bleeding may make you lose iron. Good sources of iron include:  ? Beef. ? Lentils. ? Spinach. ? Raisins. ? Iron-enriched breads and cereals. When should you call for help? Watch closely for changes in your health, and be sure to contact your doctor if you have any problems. Where can you learn more? Go to http://hans-jannette.info/. Enter B508 in the search box to learn more about \"Diet for Inflammatory Bowel Disease: Care Instructions. \"  Current as of: November 7, 2018  Content Version: 12.1  © 9646-8588 Healthwise, [employer]. Care instructions adapted under license by [employer] (which disclaims liability or warranty for this information). If you have questions about a medical condition or this instruction, always ask your healthcare professional. [address] any warranty or liability for your use of this information.

## 2019-08-13 NOTE — ED NOTES
Dr. Michelle Flores at bedside to provide discharge paperwork. Vital signs stable. Pt in no apparent distress at this time. Mental status at baseline. Ambulatory to waiting room with steady gate, discharge paperwork in hand. Accompanied by  to drive pt home.

## 2019-08-13 NOTE — ED PROVIDER NOTES
EMERGENCY DEPARTMENT HISTORY AND PHYSICAL EXAM      Date: 8/13/2019  Patient Name: Nick Warner    History of Presenting Illness     Chief Complaint   Patient presents with    Abdominal Pain     Ambulatory c/o increased abd pain x last night Pt states that week ago she began having diarrhea States last Wed she was seen at [facility] and  with dehydration and elevated liver enzymes (hx of same)       History Provided By: Patient    HPI: Nick Woodardece, 38 y.o. female with PMHx significant for GERD, IBD, anxiety, who presents with a chief complaint of abdominal pain, as well as intermittent diarrhea. She states her symptoms have been intermittent for the last week, however she has had constant, epigastric/mid abdomen pain since 4 PM.  She called her GI doctor and was told to come into the emergency department for evaluation. History of prior appendectomy and cholecystectomy. No fevers, vomiting, urinary symptoms, chest pain, shortness of breath    GI: Carie Calvert      PCP: Katherin Montes De Oca MD    There are no other complaints, changes, or physical findings at this time. Current Outpatient Medications   Medication Sig Dispense Refill    omeprazole magnesium (PRILOSEC PO) Take 20 mg by mouth.  ondansetron (ZOFRAN ODT) 4 mg disintegrating tablet Take 1 Tab by mouth every eight (8) hours as needed for Nausea. 10 Tab 0    amoxicillin-clavulanate (AUGMENTIN) 875-125 mg per tablet Take 1 Tab by mouth two (2) times a day for 5 days. 10 Tab 0    losartan (COZAAR) 100 mg tablet TAKE 1 TABLET BY MOUTH EVERY DAY 90 Tab 3    metoprolol succinate (TOPROL-XL) 25 mg XL tablet TAKE 1 TABLET BY MOUTH EVERY DAY 90 Tab 3    aspirin delayed-release 81 mg tablet Take  by mouth daily.       zolpidem (AMBIEN) 10 mg tablet TAKE ONE-HALF TABLET BY MOUTH EVERY EVENING AS NEEDED FOR INSOMNIA  1    spironolactone (ALDACTONE) 25 mg tablet TAKE 1 TABLET BY MOUTH EVERY DAY  4    cetirizine (ZYRTEC) 10 mg tablet Take  by mouth daily.  promethazine (PHENERGAN) 25 mg tablet Take 1 Tab by mouth every six (6) hours as needed for Nausea. 12 Tab 0    CRYSELLE, 28, 0.3-30 mg-mcg tab 1 Tab daily. 12    RABEprazole (ACIPHEX) 20 mg tablet Take 20 mg by mouth daily.  escitalopram oxalate (LEXAPRO) 10 mg tablet Take 1 Tab by mouth daily. 27 Tab 1     Past History     Past Medical History:  Past Medical History:   Diagnosis Date    Acid reflux     Anemia     Anxiety     Asthma     no attack in yrs    Headache     Hx of colonoscopy     x 2    Hypercholesterolemia     Hypertension     Ill-defined condition     Mitral Valve Prolapse    Irritable bowel syndrome (IBS)     Nausea & vomiting     Other ill-defined conditions(799.89)     hypoglycemia fibromyalgia    Pneumonia 9/13     Past Surgical History:  Past Surgical History:   Procedure Laterality Date    HX APPENDECTOMY  8/15/14    HX CHOLECYSTECTOMY  2007    gallstones pancratitis     HX ENDOSCOPY      x 3    HX GYN      vaginal birth x 1    HX HEENT      lasix    HX HEENT      bilateral eye surgery    HX HEENT      oral surgery    HX HEENT      septal surg     Family History:  Family History   Problem Relation Age of Onset    Cancer Maternal Grandmother         breast abdomnal    Cancer Paternal Grandmother         panreatic    Headache Father     Headache Sister     Cancer Child         spina bifida     Social History:  Social History     Tobacco Use    Smoking status: Never Smoker    Smokeless tobacco: Never Used   Substance Use Topics    Alcohol use: Yes     Alcohol/week: 2.0 - 3.0 standard drinks     Types: 2 - 3 Glasses of wine per week     Comment: occ    Drug use: No     Allergies:   Allergies   Allergen Reactions    Avelox [Moxifloxacin] Other (comments)     Elevated liver enzymes    Erythromycin Nausea and Vomiting    Levaquin [Levofloxacin] Nausea and Vomiting    Zithromax [Azithromycin] Hives     Review of Systems   Review of Systems Constitutional: Negative for chills and fever. HENT: Negative for congestion, rhinorrhea and sore throat. Respiratory: Negative for cough and shortness of breath. Cardiovascular: Negative for chest pain. Gastrointestinal: Positive for abdominal pain and diarrhea. Negative for nausea and vomiting. Genitourinary: Negative for dysuria and urgency. Skin: Negative for rash. Neurological: Negative for dizziness, light-headedness and headaches. All other systems reviewed and are negative. Physical Exam   Physical Exam   Constitutional: She is oriented to person, place, and time. She appears well-developed and well-nourished. No distress. HENT:   Head: Normocephalic and atraumatic. Eyes: Pupils are equal, round, and reactive to light. Conjunctivae and EOM are normal.   Neck: Normal range of motion. Cardiovascular: Normal rate, regular rhythm and intact distal pulses. Pulmonary/Chest: Effort normal and breath sounds normal. No stridor. No respiratory distress. Abdominal: Soft. She exhibits no distension. There is tenderness in the epigastric area. Musculoskeletal: Normal range of motion. Neurological: She is alert and oriented to person, place, and time. Skin: Skin is warm and dry. Psychiatric: She has a normal mood and affect. Nursing note and vitals reviewed.     Diagnostic Study Results   Labs -     Recent Results (from the past 12 hour(s))   CBC WITH AUTOMATED DIFF    Collection Time: 08/13/19  8:40 AM   Result Value Ref Range    WBC 6.0 3.6 - 11.0 K/uL    RBC 4.72 3.80 - 5.20 M/uL    HGB 12.6 11.5 - 16.0 g/dL    HCT 38.7 35.0 - 47.0 %    MCV 82.0 80.0 - 99.0 FL    MCH 26.7 26.0 - 34.0 PG    MCHC 32.6 30.0 - 36.5 g/dL    RDW 13.9 11.5 - 14.5 %    PLATELET 547 489 - 273 K/uL    MPV 9.5 8.9 - 12.9 FL    NRBC 0.0 0  WBC    ABSOLUTE NRBC 0.00 0.00 - 0.01 K/uL    NEUTROPHILS 63 32 - 75 %    LYMPHOCYTES 28 12 - 49 %    MONOCYTES 6 5 - 13 %    EOSINOPHILS 2 0 - 7 %    BASOPHILS 1 0 - 1 %    IMMATURE GRANULOCYTES 0 0.0 - 0.5 %    ABS. NEUTROPHILS 3.8 1.8 - 8.0 K/UL    ABS. LYMPHOCYTES 1.7 0.8 - 3.5 K/UL    ABS. MONOCYTES 0.4 0.0 - 1.0 K/UL    ABS. EOSINOPHILS 0.1 0.0 - 0.4 K/UL    ABS. BASOPHILS 0.0 0.0 - 0.1 K/UL    ABS. IMM. GRANS. 0.0 0.00 - 0.04 K/UL    DF AUTOMATED     METABOLIC PANEL, COMPREHENSIVE    Collection Time: 08/13/19  8:40 AM   Result Value Ref Range    Sodium 140 136 - 145 mmol/L    Potassium 3.4 (L) 3.5 - 5.1 mmol/L    Chloride 108 97 - 108 mmol/L    CO2 22 21 - 32 mmol/L    Anion gap 10 5 - 15 mmol/L    Glucose 97 65 - 100 mg/dL    BUN 8 6 - 20 MG/DL    Creatinine 0.86 0.55 - 1.02 MG/DL    BUN/Creatinine ratio 9 (L) 12 - 20      GFR est AA >60 >60 ml/min/1.73m2    GFR est non-AA >60 >60 ml/min/1.73m2    Calcium 8.6 8.5 - 10.1 MG/DL    Bilirubin, total 0.4 0.2 - 1.0 MG/DL    ALT (SGPT) 68 12 - 78 U/L    AST (SGOT) 41 (H) 15 - 37 U/L    Alk. phosphatase 76 45 - 117 U/L    Protein, total 6.8 6.4 - 8.2 g/dL    Albumin 3.3 (L) 3.5 - 5.0 g/dL    Globulin 3.5 2.0 - 4.0 g/dL    A-G Ratio 0.9 (L) 1.1 - 2.2     LIPASE    Collection Time: 08/13/19  8:40 AM   Result Value Ref Range    Lipase 217 73 - 393 U/L   URINALYSIS W/ REFLEX CULTURE    Collection Time: 08/13/19  9:30 AM   Result Value Ref Range    Color YELLOW/STRAW      Appearance CLEAR CLEAR      Specific gravity 1.007 1.003 - 1.030      pH (UA) 6.0 5.0 - 8.0      Protein NEGATIVE  NEG mg/dL    Glucose NEGATIVE  NEG mg/dL    Ketone NEGATIVE  NEG mg/dL    Bilirubin NEGATIVE  NEG      Blood NEGATIVE  NEG      Urobilinogen 0.2 0.2 - 1.0 EU/dL    Nitrites NEGATIVE  NEG      Leukocyte Esterase TRACE (A) NEG      WBC 5-10 0 - 4 /hpf    RBC 0-5 0 - 5 /hpf    Epithelial cells FEW FEW /lpf    Bacteria NEGATIVE  NEG /hpf    UA:UC IF INDICATED URINE CULTURE ORDERED (A) CNI      Hyaline cast 0-2 0 - 5 /lpf       Radiologic Studies -   CT ABD PELV W CONT   Final Result   IMPRESSION:   Concern for wall thickening involving the distal ileum. Please correlate with   any history of Crohn's disease. Horseshoe kidney. Ct Abd Pelv W Cont    Result Date: 8/13/2019  IMPRESSION: Concern for wall thickening involving the distal ileum. Please correlate with any history of Crohn's disease. Horseshoe kidney. Medical Decision Making   I am the first provider for this patient. I reviewed the vital signs, available nursing notes, past medical history, past surgical history, family history and social history. Vital Signs-Reviewed the patient's vital signs. Patient Vitals for the past 12 hrs:   Temp Pulse Resp BP SpO2   08/13/19 1000  81  122/66 100 %   08/13/19 0952  81  126/71 97 %   08/13/19 0845  85  127/82 98 %   08/13/19 0810 97.3 °F (36.3 °C) 90 16 151/87 99 %       Pulse Oximetry Analysis - 100% on ra    Records Reviewed: Nursing Notes and Old Medical Records    Provider Notes (Medical Decision Making):   Patient presents with abdominal pain, intermittent diarrhea. Differential includes IBS, IBD, colitis, diverticulitis, pancreatitis given location of pain. Will check basic labs, urinalysis, lipase, CT abdomen, treat pain and reevaluate    ED Course:   Initial assessment performed. The patients presenting problems have been discussed, and they are in agreement with the care plan formulated and outlined with them. I have encouraged them to ask questions as they arise throughout their visit. CT scan with possible inflammation of the terminal ileum. Patient is feeling improved. She remains afebrile, hemodynamically stable. Labs are within normal limits. Will discharge her short course of antibiotics and instructed to call her GI doctor for follow-up. Critical Care:  none    Disposition:  Discharge Note:  10:56 AM  The patient has been re-evaluated and is ready for discharge. Reviewed available results with patient. Counseled patient on diagnosis and care plan.  Patient has expressed understanding, and all questions have been answered. Patient agrees with plan and agrees to follow up as recommended, or to return to the ED if their symptoms worsen. Discharge instructions have been provided and explained to the patient, along with reasons to return to the ED. PLAN:  1. Discharge Medication List as of 8/13/2019 10:56 AM      START taking these medications    Details   ondansetron (ZOFRAN ODT) 4 mg disintegrating tablet Take 1 Tab by mouth every eight (8) hours as needed for Nausea., Normal, Disp-10 Tab, R-0      amoxicillin-clavulanate (AUGMENTIN) 875-125 mg per tablet Take 1 Tab by mouth two (2) times a day for 5 days. , Normal, Disp-10 Tab, R-0         CONTINUE these medications which have NOT CHANGED    Details   omeprazole magnesium (PRILOSEC PO) Take 20 mg by mouth., Historical Med      losartan (COZAAR) 100 mg tablet TAKE 1 TABLET BY MOUTH EVERY DAY, Normal, Disp-90 Tab, R-3      metoprolol succinate (TOPROL-XL) 25 mg XL tablet TAKE 1 TABLET BY MOUTH EVERY DAY, Normal, Disp-90 Tab, R-3      aspirin delayed-release 81 mg tablet Take  by mouth daily. , Historical Med      zolpidem (AMBIEN) 10 mg tablet TAKE ONE-HALF TABLET BY MOUTH EVERY EVENING AS NEEDED FOR INSOMNIA, Historical Med, R-1      spironolactone (ALDACTONE) 25 mg tablet TAKE 1 TABLET BY MOUTH EVERY DAY, Historical Med, R-4      cetirizine (ZYRTEC) 10 mg tablet Take  by mouth daily. , Historical Med      promethazine (PHENERGAN) 25 mg tablet Take 1 Tab by mouth every six (6) hours as needed for Nausea., Normal, Disp-12 Tab, R-0      CRYSELLE, 28, 0.3-30 mg-mcg tab 1 Tab daily. , Historical Med, R-12, ROCIO      RABEprazole (ACIPHEX) 20 mg tablet Take 20 mg by mouth daily. , Historical Med      escitalopram oxalate (LEXAPRO) 10 mg tablet Take 1 Tab by mouth daily. , Normal, Disp-30 Tab, R-1           2.    Follow-up Information     Follow up With Specialties Details Why Contact Ada Cohen MD Family Practice Schedule an appointment as soon as possible for a visit  [address]      Stephanie Wilson MD Gastroenterology Schedule an appointment as soon as possible for a visit  [address]  [facility]  [phone]      [facility] EMERGENCY DEPT Emergency Medicine  As needed, If symptoms worsen [address]  [address] [facility]  [phone]        Return to ED if worse     Diagnosis     Clinical Impression:   1. Terminal ileitis without complication ([facility] [address]        This note will not be viewable in [address] Please note that this dictation was completed with [employer], the computer voice recognition software. Quite often unanticipated grammatical, syntax, homophones, and other interpretive errors are inadvertently transcribed by the computer software. Please disregard these errors.   Please excuse any errors that have escaped final proofreading

## 2019-08-14 LAB
BACTERIA SPEC CULT: NORMAL
CC UR VC: NORMAL
SERVICE CMNT-IMP: NORMAL

## 2020-03-02 RX ORDER — METOPROLOL SUCCINATE 25 MG/1
TABLET, EXTENDED RELEASE ORAL
Qty: 90 TAB | Refills: 3 | Status: SHIPPED | OUTPATIENT
Start: 2020-03-02 | End: 2021-03-25 | Stop reason: SDUPTHER

## 2020-04-02 RX ORDER — LOSARTAN POTASSIUM 100 MG/1
TABLET ORAL
Qty: 90 TAB | Refills: 3 | Status: SHIPPED | OUTPATIENT
Start: 2020-04-02 | End: 2021-05-10 | Stop reason: SDUPTHER

## 2021-03-23 ENCOUNTER — TELEPHONE (OUTPATIENT)
Dept: CARDIOLOGY CLINIC | Age: 46
End: 2021-03-23

## 2021-03-25 ENCOUNTER — OFFICE VISIT (OUTPATIENT)
Dept: CARDIOLOGY CLINIC | Age: 46
End: 2021-03-25
Payer: COMMERCIAL

## 2021-03-25 VITALS
HEIGHT: 63 IN | HEART RATE: 88 BPM | DIASTOLIC BLOOD PRESSURE: 80 MMHG | BODY MASS INDEX: 36.5 KG/M2 | RESPIRATION RATE: 18 BRPM | OXYGEN SATURATION: 98 % | SYSTOLIC BLOOD PRESSURE: 124 MMHG | WEIGHT: 206 LBS

## 2021-03-25 DIAGNOSIS — I49.9 IRREGULAR HEART BEAT: ICD-10-CM

## 2021-03-25 DIAGNOSIS — E78.2 MIXED HYPERLIPIDEMIA: ICD-10-CM

## 2021-03-25 DIAGNOSIS — I10 ESSENTIAL HYPERTENSION: ICD-10-CM

## 2021-03-25 DIAGNOSIS — G47.33 OSA (OBSTRUCTIVE SLEEP APNEA): ICD-10-CM

## 2021-03-25 DIAGNOSIS — R00.2 PALPITATION: Primary | ICD-10-CM

## 2021-03-25 PROCEDURE — 93000 ELECTROCARDIOGRAM COMPLETE: CPT | Performed by: INTERNAL MEDICINE

## 2021-03-25 PROCEDURE — 99213 OFFICE O/P EST LOW 20 MIN: CPT | Performed by: INTERNAL MEDICINE

## 2021-03-25 RX ORDER — DICYCLOMINE HYDROCHLORIDE 10 MG/1
10 CAPSULE ORAL
COMMUNITY

## 2021-03-25 RX ORDER — METOPROLOL SUCCINATE 25 MG/1
TABLET, EXTENDED RELEASE ORAL
Qty: 90 TAB | Refills: 3 | Status: SHIPPED | OUTPATIENT
Start: 2021-03-25 | End: 2022-04-11

## 2021-03-25 NOTE — PROGRESS NOTES
Chief Complaint   Patient presents with    Hypertension      Follow up -     Hand Swelling     hortencia \" here and there\"     1. Have you been to the ER, urgent care clinic since your last visit? Hospitalized since your last visit? Yes ED [facility] ER for gastroparesis a couple of times     2. Have you seen or consulted any other health care providers outside of the [address] [place] [address] since your last visit? Include any pap smears or colon screening.   Yes Marisela ANDERSON

## 2021-03-28 NOTE — PROGRESS NOTES
Subjective/HPI:     Rivas Cabral is a 54 y.o. female is here for routine f/u. She has a PMHx of palpitations, SEMAJ. Since last visit has been diagnosed with gastroparesis. PCP Provider  Pam Flores MD    Past Medical History:   Diagnosis Date    Acid reflux     Anemia     Anxiety     Asthma     no attack in yrs    Gastroparesis 12/2019    Headache     Hx of colonoscopy     x 2    Hypercholesterolemia     Hypertension     Ill-defined condition     Mitral Valve Prolapse    Irritable bowel syndrome (IBS)     Nausea & vomiting     Other ill-defined conditions(799.89)     hypoglycemia fibromyalgia    Pneumonia 9/13        Allergies   Allergen Reactions    Avelox [Moxifloxacin] Other (comments)     Elevated liver enzymes    Erythromycin Nausea and Vomiting    Levaquin [Levofloxacin] Nausea and Vomiting    Zithromax [Azithromycin] Hives    Doxycycline Nausea and Vomiting        Outpatient Encounter Medications as of 3/25/2021   Medication Sig Dispense Refill    dicyclomine (BENTYL) 10 mg capsule Take 10 mg by mouth 4 times daily (before meals and nightly).  metoprolol succinate (TOPROL-XL) 25 mg XL tablet TAKE 1 TABLET BY MOUTH EVERY DAY 90 Tab 3    losartan (COZAAR) 100 mg tablet TAKE 1 TABLET BY MOUTH EVERY DAY 90 Tab 3    ondansetron (ZOFRAN ODT) 4 mg disintegrating tablet Take 1 Tab by mouth every eight (8) hours as needed for Nausea. 10 Tab 0    zolpidem (AMBIEN) 10 mg tablet TAKE ONE-HALF TABLET BY MOUTH EVERY EVENING AS NEEDED FOR INSOMNIA  1    spironolactone (ALDACTONE) 25 mg tablet TAKE 1 TABLET BY MOUTH EVERY DAY  4    cetirizine (ZYRTEC) 10 mg tablet Take  by mouth daily.  promethazine (PHENERGAN) 25 mg tablet Take 1 Tab by mouth every six (6) hours as needed for Nausea. 12 Tab 0    CRYSELLE, 28, 0.3-30 mg-mcg tab 1 Tab daily. 12    escitalopram oxalate (LEXAPRO) 10 mg tablet Take 1 Tab by mouth daily.  30 Tab 1    RABEprazole (ACIPHEX) 20 mg tablet Take 20 mg by mouth daily.  [DISCONTINUED] metoprolol succinate (TOPROL-XL) 25 mg XL tablet TAKE 1 TABLET BY MOUTH EVERY DAY 90 Tab 3    omeprazole magnesium (PRILOSEC PO) Take 20 mg by mouth.  aspirin delayed-release 81 mg tablet Take  by mouth daily. No facility-administered encounter medications on file as of 3/25/2021. Review of Symptoms:    ROS    Physical Exam:      General: Well developed, in no acute distress, cooperative and alert  HEENT: No carotid bruits, no JVD, trach is midline. Neck Supple, PEERL, EOM intact. Heart:  reg rate and rhythm; normal S1/S2; no murmurs, no gallops or rubs. Respiratory: Clear bilaterally x 4, no wheezing or rales  Abdomen:   Soft, non-tender, no distention, no masses. + BS. Extremities:  Normal cap refill, no cyanosis, atraumatic. No edema. Neuro: A&Ox3, speech clear, gait stable. Skin: Skin color is normal. No rashes or lesions. Non diaphoretic  Vascular: 2+ pulses symmetric in all extremities    Vitals:    03/25/21 1457   BP: 124/80   Pulse: 88   Resp: 18   SpO2: 98%   Weight: 206 lb (93.4 kg)   Height: 5' 3\" (1.6 m)       ECG: sinus rythm    Cardiology Labs:    No results found for: FLP, CHOL, HDL, LDLC, VLDL, CHHD    No results found for: HBA1C, KVH0OKUM, CMX9BRBP    Lab Results   Component Value Date/Time    Sodium 140 08/13/2019 08:40 AM    Potassium 3.4 (L) 08/13/2019 08:40 AM    Chloride 108 08/13/2019 08:40 AM    CO2 22 08/13/2019 08:40 AM    Glucose 97 08/13/2019 08:40 AM    BUN 8 08/13/2019 08:40 AM    Creatinine 0.86 08/13/2019 08:40 AM    BUN/Creatinine ratio 9 (L) 08/13/2019 08:40 AM    GFR est AA >60 08/13/2019 08:40 AM    GFR est non-AA >60 08/13/2019 08:40 AM    Calcium 8.6 08/13/2019 08:40 AM    Anion gap 10 08/13/2019 08:40 AM    Bilirubin, total 0.4 08/13/2019 08:40 AM    ALT (SGPT) 68 08/13/2019 08:40 AM    Alk.  phosphatase 76 08/13/2019 08:40 AM    Protein, total 6.8 08/13/2019 08:40 AM    Albumin 3.3 (L) 08/13/2019 08:40 AM    Globulin 3.5 08/13/2019 08:40 AM    A-G Ratio 0.9 (L) 08/13/2019 08:40 AM          Assessment:       ICD-10-CM ICD-9-CM    1. Palpitation  R00.2 785.1    2. Irregular heart beat  I49.9 427.9 AMB POC EKG ROUTINE W/ 12 LEADS, INTER & REP   3. Essential hypertension  I10 401.9    4. Mixed hyperlipidemia  E78.2 272.2 LIPID PANEL      HEPATIC FUNCTION PANEL      CK      CK   5. SEMAJ (obstructive sleep apnea)  G47.33 327.23         Plan:     1. Palpitation  Doing well. No recurrence. 2. Irregular heart beat    - AMB POC EKG ROUTINE W/ 12 LEADS, INTER & REP    3. Essential hypertension  Well controlled. 4. Mixed hyperlipidemia  Check labs. - LIPID PANEL  - HEPATIC FUNCTION PANEL  - CK; Future  - CK    5. SEMAJ (obstructive sleep apnea)      F/u in a year.        Adela Tilley MD

## 2021-04-10 LAB
ALBUMIN SERPL-MCNC: 4.3 G/DL (ref 3.8–4.8)
ALP SERPL-CCNC: 83 IU/L (ref 39–117)
ALT SERPL-CCNC: 69 IU/L (ref 0–32)
AST SERPL-CCNC: 49 IU/L (ref 0–40)
BILIRUB DIRECT SERPL-MCNC: 0.08 MG/DL (ref 0–0.4)
BILIRUB SERPL-MCNC: 0.3 MG/DL (ref 0–1.2)
CHOLEST SERPL-MCNC: 230 MG/DL (ref 100–199)
CK SERPL-CCNC: 102 U/L (ref 32–182)
HDLC SERPL-MCNC: 32 MG/DL
IMP & REVIEW OF LAB RESULTS: NORMAL
LDLC SERPL CALC-MCNC: 160 MG/DL (ref 0–99)
PROT SERPL-MCNC: 7 G/DL (ref 6–8.5)
TRIGL SERPL-MCNC: 202 MG/DL (ref 0–149)
VLDLC SERPL CALC-MCNC: 38 MG/DL (ref 5–40)

## 2021-04-12 ENCOUNTER — TELEPHONE (OUTPATIENT)
Dept: CARDIOLOGY CLINIC | Age: 46
End: 2021-04-12

## 2021-04-12 DIAGNOSIS — E78.00 HYPERCHOLESTEROLEMIA: Primary | ICD-10-CM

## 2021-04-12 RX ORDER — ROSUVASTATIN CALCIUM 5 MG/1
5 TABLET, COATED ORAL
COMMUNITY
End: 2021-04-12 | Stop reason: SDUPTHER

## 2021-04-12 NOTE — TELEPHONE ENCOUNTER
JUANJOSE Rodríguez LPN   Caller: Unspecified (Today,  3:13 PM)             LFTs were normal, we can start her on small dose of rosuvastatin 5 mg daily.  Check lipids + LFTs in 3 months     Thanks,

## 2021-04-12 NOTE — PROGRESS NOTES
Rosemary,    Please call patient and inform that cholesterol is high. Her overall risk for heart disease is low right now, however she should be diligent with her diet and weight to help minimize further risk. Recommend low fat, low cholesterol / heart healthy diet. No need to start cholesterol medications right now, but if she does not keep cholesterol better controlled, will need to start this in the future. Total cholesterol currently 230, goal is < 200. LDL (bad cholesterol) is 160 and this should be < 100.     Thanks,  [employer]

## 2021-04-12 NOTE — TELEPHONE ENCOUNTER
Verified patient with 2 identifiers   Advised patient LFT's are normal and she can start on rosuvastatin 5 mg daily  Patient would like that sent to [facility] on file. Mailed patient lab slip to check labs in 3 months.

## 2021-04-12 NOTE — TELEPHONE ENCOUNTER
----- Message from David Lisa NP sent at 4/12/2021  1:32 PM EDT -----  Rica Townsend,    Please call patient and inform that cholesterol is high. Her overall risk for heart disease is low right now, however she should be diligent with her diet and weight to help minimize further risk. Recommend low fat, low cholesterol / heart healthy diet. No need to start cholesterol medications right now, but if she does not keep cholesterol better controlled, will need to start this in the future. Total cholesterol currently 230, goal is < 200. LDL (bad cholesterol) is 160 and this should be < 100.     Thanks,  [employer]

## 2021-04-12 NOTE — TELEPHONE ENCOUNTER
Verified patient with 2 identifiers   Spoke with patient regarding results and recommendations. Voiced understanding. Patient states the reason she had lab work done is because Dr Afshan Millan wanted to check LFT's so she could start cholesterol med to lower lipids. Please advise. States she can not eat a lot of things that could lower her cholesterol. Please advise.

## 2021-04-13 RX ORDER — ROSUVASTATIN CALCIUM 5 MG/1
5 TABLET, COATED ORAL
Qty: 30 TAB | Refills: 1 | Status: SHIPPED | OUTPATIENT
Start: 2021-04-13 | End: 2021-05-13

## 2021-12-01 ENCOUNTER — HOSPITAL ENCOUNTER (EMERGENCY)
Age: 46
Discharge: HOME | End: 2021-12-01
Attending: EMERGENCY MEDICINE
Payer: COMMERCIAL

## 2021-12-01 ENCOUNTER — APPOINTMENT (OUTPATIENT)
Dept: CT IMAGING | Age: 46
End: 2021-12-01
Attending: EMERGENCY MEDICINE
Payer: COMMERCIAL

## 2021-12-01 VITALS
OXYGEN SATURATION: 100 % | SYSTOLIC BLOOD PRESSURE: 140 MMHG | TEMPERATURE: 99.2 F | HEART RATE: 93 BPM | WEIGHT: 198.19 LBS | DIASTOLIC BLOOD PRESSURE: 88 MMHG | BODY MASS INDEX: 35.12 KG/M2 | RESPIRATION RATE: 16 BRPM | HEIGHT: 63 IN

## 2021-12-01 DIAGNOSIS — R10.84 ABDOMINAL PAIN, GENERALIZED: Primary | ICD-10-CM

## 2021-12-01 LAB
ALBUMIN SERPL-MCNC: 3.6 G/DL (ref 3.5–5)
ALBUMIN/GLOB SERPL: 0.9 {RATIO} (ref 1.1–2.2)
ALP SERPL-CCNC: 73 U/L (ref 45–117)
ALT SERPL-CCNC: 36 U/L (ref 12–78)
ANION GAP SERPL CALC-SCNC: 8 MMOL/L (ref 5–15)
APPEARANCE UR: CLEAR
AST SERPL-CCNC: 18 U/L (ref 15–37)
ATRIAL RATE: 84 BPM
BACTERIA URNS QL MICRO: NEGATIVE /HPF
BASOPHILS # BLD: 0 K/UL (ref 0–0.1)
BASOPHILS NFR BLD: 1 % (ref 0–1)
BILIRUB SERPL-MCNC: 0.2 MG/DL (ref 0.2–1)
BILIRUB UR QL: NEGATIVE
BUN SERPL-MCNC: 9 MG/DL (ref 6–20)
BUN/CREAT SERPL: 9 (ref 12–20)
CALCIUM SERPL-MCNC: 8.9 MG/DL (ref 8.5–10.1)
CALCULATED P AXIS, ECG09: 55 DEGREES
CALCULATED R AXIS, ECG10: 12 DEGREES
CALCULATED T AXIS, ECG11: 38 DEGREES
CHLORIDE SERPL-SCNC: 108 MMOL/L (ref 97–108)
CO2 SERPL-SCNC: 24 MMOL/L (ref 21–32)
COLOR UR: ABNORMAL
COMMENT, HOLDF: NORMAL
CREAT SERPL-MCNC: 0.95 MG/DL (ref 0.55–1.02)
DIAGNOSIS, 93000: NORMAL
DIFFERENTIAL METHOD BLD: ABNORMAL
EOSINOPHIL # BLD: 0.1 K/UL (ref 0–0.4)
EOSINOPHIL NFR BLD: 1 % (ref 0–7)
EPITH CASTS URNS QL MICRO: ABNORMAL /LPF
ERYTHROCYTE [DISTWIDTH] IN BLOOD BY AUTOMATED COUNT: 15.1 % (ref 11.5–14.5)
GLOBULIN SER CALC-MCNC: 3.8 G/DL (ref 2–4)
GLUCOSE SERPL-MCNC: 119 MG/DL (ref 65–100)
GLUCOSE UR STRIP.AUTO-MCNC: NEGATIVE MG/DL
HCG UR QL: NEGATIVE
HCT VFR BLD AUTO: 37.2 % (ref 35–47)
HGB BLD-MCNC: 12 G/DL (ref 11.5–16)
HGB UR QL STRIP: NEGATIVE
IMM GRANULOCYTES # BLD AUTO: 0 K/UL (ref 0–0.04)
IMM GRANULOCYTES NFR BLD AUTO: 0 % (ref 0–0.5)
KETONES UR QL STRIP.AUTO: NEGATIVE MG/DL
LEUKOCYTE ESTERASE UR QL STRIP.AUTO: ABNORMAL
LIPASE SERPL-CCNC: 222 U/L (ref 73–393)
LYMPHOCYTES # BLD: 2.2 K/UL (ref 0.8–3.5)
LYMPHOCYTES NFR BLD: 28 % (ref 12–49)
MCH RBC QN AUTO: 26.5 PG (ref 26–34)
MCHC RBC AUTO-ENTMCNC: 32.3 G/DL (ref 30–36.5)
MCV RBC AUTO: 82.1 FL (ref 80–99)
MONOCYTES # BLD: 0.5 K/UL (ref 0–1)
MONOCYTES NFR BLD: 6 % (ref 5–13)
NEUTS SEG # BLD: 5.3 K/UL (ref 1.8–8)
NEUTS SEG NFR BLD: 64 % (ref 32–75)
NITRITE UR QL STRIP.AUTO: NEGATIVE
NRBC # BLD: 0 K/UL (ref 0–0.01)
NRBC BLD-RTO: 0 PER 100 WBC
P-R INTERVAL, ECG05: 160 MS
PH UR STRIP: 6 [PH] (ref 5–8)
PLATELET # BLD AUTO: 372 K/UL (ref 150–400)
PMV BLD AUTO: 9.3 FL (ref 8.9–12.9)
POTASSIUM SERPL-SCNC: 3.1 MMOL/L (ref 3.5–5.1)
PROT SERPL-MCNC: 7.4 G/DL (ref 6.4–8.2)
PROT UR STRIP-MCNC: ABNORMAL MG/DL
Q-T INTERVAL, ECG07: 380 MS
QRS DURATION, ECG06: 76 MS
QTC CALCULATION (BEZET), ECG08: 449 MS
RBC # BLD AUTO: 4.53 M/UL (ref 3.8–5.2)
RBC #/AREA URNS HPF: ABNORMAL /HPF (ref 0–5)
SAMPLES BEING HELD,HOLD: NORMAL
SODIUM SERPL-SCNC: 140 MMOL/L (ref 136–145)
SP GR UR REFRACTOMETRY: 1.02 (ref 1–1.03)
UROBILINOGEN UR QL STRIP.AUTO: 1 EU/DL (ref 0.2–1)
VENTRICULAR RATE, ECG03: 84 BPM
WBC # BLD AUTO: 8.1 K/UL (ref 3.6–11)
WBC URNS QL MICRO: ABNORMAL /HPF (ref 0–4)

## 2021-12-01 PROCEDURE — 85025 COMPLETE CBC W/AUTO DIFF WBC: CPT

## 2021-12-01 PROCEDURE — 80053 COMPREHEN METABOLIC PANEL: CPT

## 2021-12-01 PROCEDURE — 74011000636 HC RX REV CODE- 636: Performed by: EMERGENCY MEDICINE

## 2021-12-01 PROCEDURE — 83690 ASSAY OF LIPASE: CPT

## 2021-12-01 PROCEDURE — 81025 URINE PREGNANCY TEST: CPT

## 2021-12-01 PROCEDURE — 74011250637 HC RX REV CODE- 250/637: Performed by: EMERGENCY MEDICINE

## 2021-12-01 PROCEDURE — 74011250636 HC RX REV CODE- 250/636: Performed by: EMERGENCY MEDICINE

## 2021-12-01 PROCEDURE — 36415 COLL VENOUS BLD VENIPUNCTURE: CPT

## 2021-12-01 PROCEDURE — 96375 TX/PRO/DX INJ NEW DRUG ADDON: CPT

## 2021-12-01 PROCEDURE — 99284 EMERGENCY DEPT VISIT MOD MDM: CPT

## 2021-12-01 PROCEDURE — 96374 THER/PROPH/DIAG INJ IV PUSH: CPT

## 2021-12-01 PROCEDURE — 74177 CT ABD & PELVIS W/CONTRAST: CPT

## 2021-12-01 PROCEDURE — 93005 ELECTROCARDIOGRAM TRACING: CPT

## 2021-12-01 PROCEDURE — 81001 URINALYSIS AUTO W/SCOPE: CPT

## 2021-12-01 RX ORDER — HYOSCYAMINE SULFATE 0.12 MG/1
0.25 TABLET SUBLINGUAL
Status: DISCONTINUED | OUTPATIENT
Start: 2021-12-01 | End: 2021-12-01

## 2021-12-01 RX ORDER — HYOSCYAMINE SULFATE 0.12 MG/1
0.12 TABLET SUBLINGUAL
Qty: 20 TABLET | Refills: 0 | Status: SHIPPED | OUTPATIENT
Start: 2021-12-01 | End: 2022-03-24

## 2021-12-01 RX ORDER — KETOROLAC TROMETHAMINE 30 MG/ML
15 INJECTION, SOLUTION INTRAMUSCULAR; INTRAVENOUS
Status: COMPLETED | OUTPATIENT
Start: 2021-12-01 | End: 2021-12-01

## 2021-12-01 RX ORDER — HYOSCYAMINE SULFATE 0.12 MG/1
0.12 TABLET SUBLINGUAL ONCE
Status: COMPLETED | OUTPATIENT
Start: 2021-12-01 | End: 2021-12-01

## 2021-12-01 RX ORDER — METOCLOPRAMIDE HYDROCHLORIDE 5 MG/ML
10 INJECTION INTRAMUSCULAR; INTRAVENOUS
Status: COMPLETED | OUTPATIENT
Start: 2021-12-01 | End: 2021-12-01

## 2021-12-01 RX ADMIN — KETOROLAC TROMETHAMINE 15 MG: 30 INJECTION, SOLUTION INTRAMUSCULAR at 18:00

## 2021-12-01 RX ADMIN — METOCLOPRAMIDE 10 MG: 5 INJECTION, SOLUTION INTRAMUSCULAR; INTRAVENOUS at 18:47

## 2021-12-01 RX ADMIN — HYOSCYAMINE SULFATE 0.12 MG: 0.12 TABLET ORAL; SUBLINGUAL at 18:00

## 2021-12-01 RX ADMIN — IOPAMIDOL 100 ML: 755 INJECTION, SOLUTION INTRAVENOUS at 19:08

## 2021-12-01 NOTE — ED NOTES
1846: Patient to CT at this time. 1915: Bedside and Verbal shift change report given to Coral Crum RN (oncoming nurse) by Zo Leblanc RN (offgoing nurse). Report included the following information SBAR, Kardex, ED Summary, Intake/Output, MAR and Recent Results.

## 2021-12-01 NOTE — ED PROVIDER NOTES
EMERGENCY DEPARTMENT HISTORY AND PHYSICAL EXAM      Date: 12/1/2021  Patient Name: Guanako Duncan    Please note that this dictation was completed with [employer], the computer voice recognition software. Quite often unanticipated grammatical, syntax, homophones, and other interpretive errors are inadvertently transcribed by the computer software. Please disregard these errors. Please excuse any errors that have escaped final proofreading. History of Presenting Illness     Chief Complaint   Patient presents with    Abdominal Pain     middle abdominal pain over a week; nausea and dry heaves; had an Ultrasound by GI doctor; not eating no appetite. no belching. History Provided By: Patient     HPI: Guanako Duncan, 51 y.o. female, presenting the emergency department complaining of abdominal pain has been going on for several weeks. She has a history of gastroparesis, is on Reglan, has been taking Phenergan and Zofran for the nausea as well with no relief. Worse after eating. No change in stool. No fevers or chills. Rates her symptoms as severe. Has taken some ibuprofen for her pain with minimal relief. PCP: Debbie Scott MD    No current facility-administered medications on file prior to encounter. Current Outpatient Medications on File Prior to Encounter   Medication Sig Dispense Refill    rosuvastatin (CRESTOR) 5 mg tablet TAKE 1 TABLET BY MOUTH EVERY DAY AT NIGHT 90 Tab 3    losartan (COZAAR) 100 mg tablet TAKE 1 TABLET BY MOUTH EVERY DAY 90 Tab 3    dicyclomine (BENTYL) 10 mg capsule Take 10 mg by mouth 4 times daily (before meals and nightly).  metoprolol succinate (TOPROL-XL) 25 mg XL tablet TAKE 1 TABLET BY MOUTH EVERY DAY 90 Tab 3    omeprazole magnesium (PRILOSEC PO) Take 20 mg by mouth.  ondansetron (ZOFRAN ODT) 4 mg disintegrating tablet Take 1 Tab by mouth every eight (8) hours as needed for Nausea.  10 Tab 0    aspirin delayed-release 81 mg tablet Take  by mouth daily.      zolpidem (AMBIEN) 10 mg tablet TAKE ONE-HALF TABLET BY MOUTH EVERY EVENING AS NEEDED FOR INSOMNIA  1    spironolactone (ALDACTONE) 25 mg tablet TAKE 1 TABLET BY MOUTH EVERY DAY  4    cetirizine (ZYRTEC) 10 mg tablet Take  by mouth daily.  promethazine (PHENERGAN) 25 mg tablet Take 1 Tab by mouth every six (6) hours as needed for Nausea. 12 Tab 0    CRYSELLE, 28, 0.3-30 mg-mcg tab 1 Tab daily. 12    escitalopram oxalate (LEXAPRO) 10 mg tablet Take 1 Tab by mouth daily. 30 Tab 1    RABEprazole (ACIPHEX) 20 mg tablet Take 20 mg by mouth daily. Past History     Past Medical History:  Past Medical History:   Diagnosis Date    Acid reflux     Anemia     Anxiety     Asthma     no attack in yrs    Gastroparesis 12/2019    Headache     Hx of colonoscopy     x 2    Hypercholesterolemia     Hypertension     Ill-defined condition     Mitral Valve Prolapse    Irritable bowel syndrome (IBS)     Nausea & vomiting     Other ill-defined conditions(799.89)     hypoglycemia fibromyalgia    Pneumonia 9/13       Past Surgical History:  Past Surgical History:   Procedure Laterality Date    HX APPENDECTOMY  8/15/14    HX CHOLECYSTECTOMY  2007    gallstones pancratitis     HX ENDOSCOPY      x 3    HX GYN      vaginal birth x 1    HX HEENT      lasix    HX HEENT      bilateral eye surgery    HX HEENT      oral surgery    HX HEENT      septal surg       Family History:  Family History   Problem Relation Age of Onset    Cancer Maternal Grandmother         breast abdomnal    Cancer Paternal Grandmother         panreatic    Headache Father     Headache Sister     Cancer Child         spina bifida       Social History:  Social History     Tobacco Use    Smoking status: Never Smoker    Smokeless tobacco: Never Used   Vaping Use    Vaping Use: Never used   Substance Use Topics    Alcohol use:  Yes     Alcohol/week: 2.0 - 3.0 standard drinks     Types: 2 - 3 Glasses of wine per week Comment: occ    Drug use: No       Allergies: Allergies   Allergen Reactions    Avelox [Moxifloxacin] Other (comments)     Elevated liver enzymes    Erythromycin Nausea and Vomiting    Levaquin [Levofloxacin] Nausea and Vomiting    Zithromax [Azithromycin] Hives    Doxycycline Nausea and Vomiting         Review of Systems   Review of Systems   Constitutional: Negative for chills and fever. HENT: Negative for congestion and sore throat. Eyes: Negative for visual disturbance. Respiratory: Negative for cough and shortness of breath. Cardiovascular: Negative for chest pain and leg swelling. Gastrointestinal: Positive for abdominal pain, nausea and vomiting. Negative for blood in stool and diarrhea. Endocrine: Negative for polyuria. Genitourinary: Negative for dysuria, flank pain, vaginal bleeding and vaginal discharge. Musculoskeletal: Negative for myalgias. Skin: Negative for rash. Allergic/Immunologic: Negative for immunocompromised state. Neurological: Negative for weakness and headaches. Psychiatric/Behavioral: Negative for confusion. Physical Exam   Physical Exam  Vitals and nursing note reviewed. Constitutional:       Appearance: She is well-developed. HENT:      Head: Normocephalic and atraumatic. Eyes:      General:         Right eye: No discharge. Left eye: No discharge. Conjunctiva/sclera: Conjunctivae normal.      Pupils: Pupils are equal, round, and reactive to light. Neck:      Trachea: No tracheal deviation. Cardiovascular:      Rate and Rhythm: Normal rate and regular rhythm. Heart sounds: Normal heart sounds. No murmur heard. Pulmonary:      Effort: Pulmonary effort is normal. No respiratory distress. Breath sounds: Normal breath sounds. No wheezing or rales. Abdominal:      General: Bowel sounds are normal.      Palpations: Abdomen is soft. Tenderness: There is abdominal tenderness in the epigastric area.  There is no guarding or rebound. Musculoskeletal:         General: No tenderness or deformity. Normal range of motion. Cervical back: Normal range of motion and neck supple. Skin:     General: Skin is warm and dry. Findings: No erythema or rash. Neurological:      Mental Status: She is alert and oriented to person, place, and time. Psychiatric:         Behavior: Behavior normal.         Diagnostic Study Results     Labs -     Recent Results (from the past 12 hour(s))   EKG, 12 LEAD, INITIAL    Collection Time: 12/01/21  3:22 PM   Result Value Ref Range    Ventricular Rate 84 BPM    Atrial Rate 84 BPM    P-R Interval 160 ms    QRS Duration 76 ms    Q-T Interval 380 ms    QTC Calculation (Bezet) 449 ms    Calculated P Axis 55 degrees    Calculated R Axis 12 degrees    Calculated T Axis 38 degrees    Diagnosis       Normal sinus rhythm  Minimal voltage criteria for LVH, may be normal variant  When compared with ECG of 12-DEC-2017 15:05,  No significant change was found  Confirmed by Meghna English ([id number]) on 12/1/2021 4:24:55 PM     CBC WITH AUTOMATED DIFF    Collection Time: 12/01/21  3:26 PM   Result Value Ref Range    WBC 8.1 3.6 - 11.0 K/uL    RBC 4.53 3.80 - 5.20 M/uL    HGB 12.0 11.5 - 16.0 g/dL    HCT 37.2 35.0 - 47.0 %    MCV 82.1 80.0 - 99.0 FL    MCH 26.5 26.0 - 34.0 PG    MCHC 32.3 30.0 - 36.5 g/dL    RDW 15.1 (H) 11.5 - 14.5 %    PLATELET 743 168 - 858 K/uL    MPV 9.3 8.9 - 12.9 FL    NRBC 0.0 0  WBC    ABSOLUTE NRBC 0.00 0.00 - 0.01 K/uL    NEUTROPHILS 64 32 - 75 %    LYMPHOCYTES 28 12 - 49 %    MONOCYTES 6 5 - 13 %    EOSINOPHILS 1 0 - 7 %    BASOPHILS 1 0 - 1 %    IMMATURE GRANULOCYTES 0 0.0 - 0.5 %    ABS. NEUTROPHILS 5.3 1.8 - 8.0 K/UL    ABS. LYMPHOCYTES 2.2 0.8 - 3.5 K/UL    ABS. MONOCYTES 0.5 0.0 - 1.0 K/UL    ABS. EOSINOPHILS 0.1 0.0 - 0.4 K/UL    ABS. BASOPHILS 0.0 0.0 - 0.1 K/UL    ABS. IMM.  GRANS. 0.0 0.00 - 0.04 K/UL    DF AUTOMATED     METABOLIC PANEL, COMPREHENSIVE    Collection Time: 12/01/21  3:26 PM   Result Value Ref Range    Sodium 140 136 - 145 mmol/L    Potassium 3.1 (L) 3.5 - 5.1 mmol/L    Chloride 108 97 - 108 mmol/L    CO2 24 21 - 32 mmol/L    Anion gap 8 5 - 15 mmol/L    Glucose 119 (H) 65 - 100 mg/dL    BUN 9 6 - 20 MG/DL    Creatinine 0.95 0.55 - 1.02 MG/DL    BUN/Creatinine ratio 9 (L) 12 - 20      GFR est AA >60 >60 ml/min/1.73m2    GFR est non-AA >60 >60 ml/min/1.73m2    Calcium 8.9 8.5 - 10.1 MG/DL    Bilirubin, total 0.2 0.2 - 1.0 MG/DL    ALT (SGPT) 36 12 - 78 U/L    AST (SGOT) 18 15 - 37 U/L    Alk. phosphatase 73 45 - 117 U/L    Protein, total 7.4 6.4 - 8.2 g/dL    Albumin 3.6 3.5 - 5.0 g/dL    Globulin 3.8 2.0 - 4.0 g/dL    A-G Ratio 0.9 (L) 1.1 - 2.2     URINALYSIS W/ RFLX MICROSCOPIC    Collection Time: 12/01/21  3:26 PM   Result Value Ref Range    Color YELLOW/STRAW      Appearance CLEAR CLEAR      Specific gravity 1.024 1.003 - 1.030      pH (UA) 6.0 5.0 - 8.0      Protein TRACE (A) NEG mg/dL    Glucose Negative NEG mg/dL    Ketone Negative NEG mg/dL    Bilirubin Negative NEG      Blood Negative NEG      Urobilinogen 1.0 0.2 - 1.0 EU/dL    Nitrites Negative NEG      Leukocyte Esterase SMALL (A) NEG      WBC 0-4 0 - 4 /hpf    RBC 0-5 0 - 5 /hpf    Epithelial cells FEW FEW /lpf    Bacteria Negative NEG /hpf   LIPASE    Collection Time: 12/01/21  3:26 PM   Result Value Ref Range    Lipase 222 73 - 393 U/L   SAMPLES BEING HELD    Collection Time: 12/01/21  3:26 PM   Result Value Ref Range    SAMPLES BEING HELD  UC     COMMENT        Add-on orders for these samples will be processed based on acceptable specimen integrity and analyte stability, which may vary by analyte. HCG URINE, QL. - POC    Collection Time: 12/01/21  6:34 PM   Result Value Ref Range    Pregnancy test,urine (POC) Negative NEG         Radiologic Studies -   CT ABD PELV W CONT   Final Result      1. No acute abdominal or pelvic abnormality.    2. Stable incidental and postoperative changes described above.        CT Results  (Last 48 hours)               12/01/21 1908  CT ABD PELV W CONT Final result    Impression:      1. No acute abdominal or pelvic abnormality. 2. Stable incidental and postoperative changes described above. Narrative:  EXAM: CT ABD PELV W CONT       INDICATION: Abdominal pain . Mid abdominal pain for one week with nausea and   loss of appetite. COMPARISON: 8/13/2019        CONTRAST: 100 mL of Isovue-370. TECHNIQUE:    Following the uneventful intravenous administration of contrast, thin axial   images were obtained through the abdomen and pelvis. Coronal and sagittal   reconstructions were generated. Oral contrast was not administered. CT dose   reduction was achieved through use of a standardized protocol tailored for this   examination and automatic exposure control for dose modulation. FINDINGS:    LOWER THORAX: No significant abnormality in the incidentally imaged lower chest.   LIVER: No mass. The liver is mildly hypodense, suggesting mild steatosis. BILIARY TREE: The gallbladder is surgically absent. CBD is not dilated. SPLEEN: within normal limits. PANCREAS: No mass or ductal dilatation. ADRENALS: Unremarkable. KIDNEYS: Horseshoe configuration with no parenchymal mass, hydronephrosis or   calcification. STOMACH: Unremarkable. SMALL BOWEL: No dilatation or wall thickening. COLON: No dilatation or wall thickening. APPENDIX: Surgically absent   PERITONEUM: No ascites or pneumoperitoneum. RETROPERITONEUM: No lymphadenopathy or aortic aneurysm. REPRODUCTIVE ORGANS: Uterus and ovaries appear unremarkable. URINARY BLADDER: Minimally distended but unremarkable   BONES: No destructive bone lesion. ABDOMINAL WALL: No mass or hernia. ADDITIONAL COMMENTS: N/A               CXR Results  (Last 48 hours)    None            Medical Decision Making   I am the first provider for this patient.     I reviewed the vital signs, available nursing notes, past medical history, past surgical history, family history and social history. Vital Signs-Reviewed the patient's vital signs. Patient Vitals for the past 12 hrs:   Temp Pulse Resp BP SpO2   12/01/21 1516 99.2 °F (37.3 °C) 93 16 (!) 140/88 100 %         Records Reviewed:   Nursing notes, Prior visits     Provider Notes (Medical Decision Making):   Patient presents with abdominal pain. DDx: Gastroenteritis, SBO, appendicitis, colitis, IBD, diverticulitis, mesenteric ischemia, AAA or descending dissection, ACS, ureteral stone. Will get labs and CT Abdomen/pelvis. ED Course:   Initial assessment performed. The patients presenting problems have been discussed, and they are in agreement with the care plan formulated and outlined with them. I have encouraged them to ask questions as they arise throughout their visit. Critical Care Time:   none    Disposition:    DISCHARGE NOTE  Patients results have been reviewed with them. Patient and/or family have verbally conveyed their understanding and agreement of the patient's signs, symptoms, diagnosis, treatment and prognosis and additionally agree to follow up as recommended or return to the Emergency Room should their condition change or have any new concerns prior to their follow-up appointment. Patient verbally agrees with the care-plan and verbally conveys that all of their questions have been answered. Discharge instructions have also been provided to the patient with some educational information regarding their diagnosis as well a list of reasons why they would want to return to the ER prior to their follow-up appointment should their condition change. PLAN:  1. Discharge Medication List as of 12/1/2021  7:52 PM        2.    Follow-up Information     Follow up With Specialties Details Why Contact Info    Vamshi Mercedes MD Family Medicine Schedule an appointment as soon as possible for a visit   [address] [place] [address] [address] EMERGENCY DEPT Emergency Medicine  If symptoms worsen [address] [facility]  [address] [facility]  [phone]    Your Gastroenterologist              Return to ED if worse     Diagnosis     Clinical Impression:   1. Abdominal pain, generalized        Attestations:   This note was completed by Nelly Oneill DO

## 2021-12-02 NOTE — ED NOTES
Patient received discharge papers. IV out and vitals stable. Patient ambulatory to waiting room with spouse.

## 2021-12-02 NOTE — ED NOTES
Patient states \"pain is different than usual, but I do not want narcotics because they will mess up my bowel\". MD Gala Garrett made aware.

## 2022-01-21 NOTE — TELEPHONE ENCOUNTER
[facility] to call me     Sonal Orosco., NP  You 13 minutes ago (4:01 PM)     Looks like Dr Brenden Perez has a 230 this Thursday.  See if that's ok with her or I can see her on Tuesday at   [address] [facility] arnold [place]o., JUANJOSE
Spoke with patient. Verified patient with two patient identifiers. Advised we could not fill Metoprolol since it has been 2 years since we have seen her. States she made an appt for mid April and wants us to fill it. States we should fill it or work her in sooner. Pls advise, do you want to fill it?
Spoke with patient. Verified patient with two patient identifiers. She will take the appt with Dr. Em Orozco for Lucina Pinon 3- arrive at 2:15 pm.  Patient verbalized understanding.
Statement Selected

## 2022-03-18 PROBLEM — E66.01 SEVERE OBESITY (HCC): Status: ACTIVE | Noted: 2019-03-14

## 2022-03-24 ENCOUNTER — OFFICE VISIT (OUTPATIENT)
Dept: CARDIOLOGY CLINIC | Age: 47
End: 2022-03-24
Payer: COMMERCIAL

## 2022-03-24 VITALS
WEIGHT: 196.5 LBS | DIASTOLIC BLOOD PRESSURE: 88 MMHG | HEIGHT: 63 IN | BODY MASS INDEX: 34.82 KG/M2 | OXYGEN SATURATION: 100 % | HEART RATE: 92 BPM | RESPIRATION RATE: 18 BRPM | SYSTOLIC BLOOD PRESSURE: 118 MMHG

## 2022-03-24 DIAGNOSIS — I49.9 IRREGULAR HEART BEAT: ICD-10-CM

## 2022-03-24 DIAGNOSIS — E78.00 HYPERCHOLESTEROLEMIA: Primary | ICD-10-CM

## 2022-03-24 PROCEDURE — 93000 ELECTROCARDIOGRAM COMPLETE: CPT | Performed by: INTERNAL MEDICINE

## 2022-03-24 PROCEDURE — 99214 OFFICE O/P EST MOD 30 MIN: CPT | Performed by: INTERNAL MEDICINE

## 2022-03-24 NOTE — PROGRESS NOTES
Estelle Borjas, [facility]    Subjective/HPI:     Marlon Presley is a 51 y.o. female is here for routine f/u. She has a PMHx of HLD, gastroparesis, and palpitations. Feeling fine. Tolerating statin well. Has been having more GI issues, following with specialists. Was told she had chronic vomiting syndrome and not much can be done. Denies palpitation symptoms, orthopnea, PND or edema. Denies chest pain or shortness of breath. Current Outpatient Medications on File Prior to Visit   Medication Sig Dispense Refill    rosuvastatin (CRESTOR) 5 mg tablet TAKE 1 TABLET BY MOUTH EVERY DAY AT NIGHT 90 Tab 3    losartan (COZAAR) 100 mg tablet TAKE 1 TABLET BY MOUTH EVERY DAY 90 Tab 3    dicyclomine (BENTYL) 10 mg capsule Take 10 mg by mouth 4 times daily (before meals and nightly).  metoprolol succinate (TOPROL-XL) 25 mg XL tablet TAKE 1 TABLET BY MOUTH EVERY DAY 90 Tab 3    omeprazole magnesium (PRILOSEC PO) Take 20 mg by mouth.  ondansetron (ZOFRAN ODT) 4 mg disintegrating tablet Take 1 Tab by mouth every eight (8) hours as needed for Nausea. 10 Tab 0    zolpidem (AMBIEN) 10 mg tablet TAKE ONE-HALF TABLET BY MOUTH EVERY EVENING AS NEEDED FOR INSOMNIA  1    spironolactone (ALDACTONE) 25 mg tablet TAKE 1 TABLET BY MOUTH EVERY DAY  4    cetirizine (ZYRTEC) 10 mg tablet Take  by mouth daily.  promethazine (PHENERGAN) 25 mg tablet Take 1 Tab by mouth every six (6) hours as needed for Nausea. 12 Tab 0    CRYSELLE, 28, 0.3-30 mg-mcg tab 1 Tab daily. 12    escitalopram oxalate (LEXAPRO) 10 mg tablet Take 1 Tab by mouth daily. 30 Tab 1    RABEprazole (ACIPHEX) 20 mg tablet Take 20 mg by mouth daily.  [DISCONTINUED] hyoscyamine SL (LEVSIN/SL) 0.125 mg SL tablet 1 Tablet by SubLINGual route every four (4) hours as needed for Cramping. (Patient not taking: Reported on 3/24/2022) 20 Tablet 0    [DISCONTINUED] aspirin delayed-release 81 mg tablet Take  by mouth daily. (Patient not taking: Reported on 3/24/2022)       No current facility-administered medications on file prior to visit. Review of Symptoms:    Review of Systems   Constitutional: Negative for chills, fever and weight loss. HENT: Negative for nosebleeds. Eyes: Negative for blurred vision and double vision. Respiratory: Negative for cough, shortness of breath and wheezing. Cardiovascular: Negative for chest pain, palpitations, orthopnea, leg swelling and PND. Gastrointestinal: Positive for abdominal pain and nausea. Skin: Negative for rash. Neurological: Negative for dizziness and loss of consciousness. Physical Exam:      General: Well developed, in no acute distress, cooperative and alert  Heart:  reg rate and rhythm; normal S1/S2; no murmurs, no gallops or rubs. Respiratory: Clear bilaterally x 4, no wheezing or rales  Extremities:  Normal cap refill, no cyanosis, atraumatic. No edema. Vascular: 2+ pulses symmetric in all extremities    Vitals:    03/24/22 1527   BP: 118/88   BP 1 Location: Left arm   BP Patient Position: Sitting   BP Cuff Size: Large adult   Pulse: 92   Resp: 18   Height: 5' 3\" (1.6 m)   Weight: 196 lb 8 oz (89.1 kg)   SpO2: 100%       ECG done today shows sinus rhythm     Assessment:       ICD-10-CM ICD-9-CM    1. Hypercholesterolemia  E78.00 272.0 AMB POC EKG ROUTINE W/ 12 LEADS, INTER & REP      LIPID PANEL      METABOLIC PANEL, COMPREHENSIVE   2. Irregular heart beat  I49.9 427.9         Plan:     1. Hypercholesterolemia   in 4/2021 -- started atorvastatin  Repeat lipids now    2.  Irregular heart beat  Stable with BB therapy  Echo done 4/2019 with preserved LVEF 55-60% with mild MR    F/u with Dr. Charlie Thurman in 1 year    Rosina Sims NP

## 2022-03-24 NOTE — PROGRESS NOTES
Chief Complaint   Patient presents with    Hyperlipidemia     Annual follow up- Denies cardiac sx,    Irregular Heart Beat     Admits taking medication. 1. Have you been to the ER, urgent care clinic since your last visit? Hospitalized since your last visit? Yes, ED [facility] ED for stomach issues. 2. Have you seen or consulted any other health care providers outside of the [address] [place] [address] since your last visit? Yes, GI Dr. Yamilka Das.

## 2022-03-25 ENCOUNTER — TELEPHONE (OUTPATIENT)
Dept: CARDIOLOGY CLINIC | Age: 47
End: 2022-03-25

## 2022-03-25 NOTE — TELEPHONE ENCOUNTER
Request for most recent lipid panel and labs faxed to the office of Dr. Bertha Petit at [phone].

## 2022-03-29 NOTE — TELEPHONE ENCOUNTER
Per Arnoldo Mcfarland. Please call patient.  Lipids reviewed.  LDL down to 76!! This is great.  Continue statin therapy.

## 2022-04-11 RX ORDER — METOPROLOL SUCCINATE 25 MG/1
TABLET, EXTENDED RELEASE ORAL
Qty: 90 TABLET | Refills: 3 | Status: SHIPPED | OUTPATIENT
Start: 2022-04-11

## 2023-05-09 RX ORDER — LOSARTAN POTASSIUM 100 MG/1
TABLET ORAL
Qty: 90 TABLET | Refills: 3 | OUTPATIENT
Start: 2023-05-09

## 2024-01-29 ENCOUNTER — HOSPITAL ENCOUNTER (EMERGENCY)
Facility: HOSPITAL | Age: 49
Discharge: HOME | End: 2024-01-29
Payer: COMMERCIAL

## 2024-01-29 ENCOUNTER — APPOINTMENT (OUTPATIENT)
Facility: HOSPITAL | Age: 49
End: 2024-01-29
Payer: COMMERCIAL

## 2024-01-29 VITALS
SYSTOLIC BLOOD PRESSURE: 143 MMHG | BODY MASS INDEX: 35.59 KG/M2 | HEART RATE: 85 BPM | RESPIRATION RATE: 18 BRPM | DIASTOLIC BLOOD PRESSURE: 85 MMHG | TEMPERATURE: 98.4 F | WEIGHT: 200.84 LBS | HEIGHT: 63 IN | OXYGEN SATURATION: 100 %

## 2024-01-29 DIAGNOSIS — R10.13 ABDOMINAL PAIN, EPIGASTRIC: Primary | ICD-10-CM

## 2024-01-29 LAB
ALBUMIN SERPL-MCNC: 3.6 G/DL (ref 3.5–5)
ALBUMIN/GLOB SERPL: 1 (ref 1.1–2.2)
ALP SERPL-CCNC: 80 U/L (ref 45–117)
ALT SERPL-CCNC: 37 U/L (ref 12–78)
ANION GAP SERPL CALC-SCNC: 6 MMOL/L (ref 5–15)
APPEARANCE UR: CLEAR
AST SERPL-CCNC: 16 U/L (ref 15–37)
BACTERIA URNS QL MICRO: ABNORMAL /HPF
BASOPHILS # BLD: 0 K/UL (ref 0–0.1)
BASOPHILS NFR BLD: 1 % (ref 0–1)
BILIRUB SERPL-MCNC: 0.3 MG/DL (ref 0.2–1)
BILIRUB UR QL: NEGATIVE
BUN SERPL-MCNC: 9 MG/DL (ref 6–20)
BUN/CREAT SERPL: 10 (ref 12–20)
CALCIUM SERPL-MCNC: 8.7 MG/DL (ref 8.5–10.1)
CHLORIDE SERPL-SCNC: 109 MMOL/L (ref 97–108)
CO2 SERPL-SCNC: 25 MMOL/L (ref 21–32)
COLOR UR: ABNORMAL
CREAT SERPL-MCNC: 0.87 MG/DL (ref 0.55–1.02)
DIFFERENTIAL METHOD BLD: ABNORMAL
EOSINOPHIL # BLD: 0.1 K/UL (ref 0–0.4)
EOSINOPHIL NFR BLD: 1 % (ref 0–7)
EPITH CASTS URNS QL MICRO: ABNORMAL /LPF
ERYTHROCYTE [DISTWIDTH] IN BLOOD BY AUTOMATED COUNT: 14.3 % (ref 11.5–14.5)
GLOBULIN SER CALC-MCNC: 3.6 G/DL (ref 2–4)
GLUCOSE SERPL-MCNC: 155 MG/DL (ref 65–100)
GLUCOSE UR STRIP.AUTO-MCNC: NEGATIVE MG/DL
HCT VFR BLD AUTO: 36.6 % (ref 35–47)
HGB BLD-MCNC: 12 G/DL (ref 11.5–16)
HGB UR QL STRIP: NEGATIVE
HYALINE CASTS URNS QL MICRO: ABNORMAL /LPF (ref 0–2)
IMM GRANULOCYTES # BLD AUTO: 0.1 K/UL (ref 0–0.04)
IMM GRANULOCYTES NFR BLD AUTO: 1 % (ref 0–0.5)
KETONES UR QL STRIP.AUTO: NEGATIVE MG/DL
LEUKOCYTE ESTERASE UR QL STRIP.AUTO: NEGATIVE
LIPASE SERPL-CCNC: 48 U/L (ref 13–75)
LYMPHOCYTES # BLD: 2.4 K/UL (ref 0.8–3.5)
LYMPHOCYTES NFR BLD: 29 % (ref 12–49)
MCH RBC QN AUTO: 27 PG (ref 26–34)
MCHC RBC AUTO-ENTMCNC: 32.8 G/DL (ref 30–36.5)
MCV RBC AUTO: 82.2 FL (ref 80–99)
MONOCYTES # BLD: 0.4 K/UL (ref 0–1)
MONOCYTES NFR BLD: 5 % (ref 5–13)
NEUTS SEG # BLD: 5.3 K/UL (ref 1.8–8)
NEUTS SEG NFR BLD: 63 % (ref 32–75)
NITRITE UR QL STRIP.AUTO: NEGATIVE
NRBC # BLD: 0 K/UL (ref 0–0.01)
NRBC BLD-RTO: 0 PER 100 WBC
PH UR STRIP: 7.5 (ref 5–8)
PLATELET # BLD AUTO: 340 K/UL (ref 150–400)
PMV BLD AUTO: 9 FL (ref 8.9–12.9)
POTASSIUM SERPL-SCNC: 3.1 MMOL/L (ref 3.5–5.1)
PROT SERPL-MCNC: 7.2 G/DL (ref 6.4–8.2)
PROT UR STRIP-MCNC: NEGATIVE MG/DL
RBC # BLD AUTO: 4.45 M/UL (ref 3.8–5.2)
RBC #/AREA URNS HPF: ABNORMAL /HPF (ref 0–5)
SODIUM SERPL-SCNC: 140 MMOL/L (ref 136–145)
SP GR UR REFRACTOMETRY: 1 (ref 1–1.03)
URINE CULTURE IF INDICATED: ABNORMAL
UROBILINOGEN UR QL STRIP.AUTO: 0.2 EU/DL (ref 0.2–1)
WBC # BLD AUTO: 8.3 K/UL (ref 3.6–11)
WBC URNS QL MICRO: ABNORMAL /HPF (ref 0–4)

## 2024-01-29 PROCEDURE — 85025 COMPLETE CBC W/AUTO DIFF WBC: CPT

## 2024-01-29 PROCEDURE — 80053 COMPREHEN METABOLIC PANEL: CPT

## 2024-01-29 PROCEDURE — 96374 THER/PROPH/DIAG INJ IV PUSH: CPT

## 2024-01-29 PROCEDURE — 99285 EMERGENCY DEPT VISIT HI MDM: CPT

## 2024-01-29 PROCEDURE — 96375 TX/PRO/DX INJ NEW DRUG ADDON: CPT

## 2024-01-29 PROCEDURE — 6360000004 HC RX CONTRAST MEDICATION: Performed by: PHYSICIAN ASSISTANT

## 2024-01-29 PROCEDURE — 83690 ASSAY OF LIPASE: CPT

## 2024-01-29 PROCEDURE — 74177 CT ABD & PELVIS W/CONTRAST: CPT

## 2024-01-29 PROCEDURE — 2580000003 HC RX 258: Performed by: PHYSICIAN ASSISTANT

## 2024-01-29 PROCEDURE — 36415 COLL VENOUS BLD VENIPUNCTURE: CPT

## 2024-01-29 PROCEDURE — 6360000002 HC RX W HCPCS: Performed by: PHYSICIAN ASSISTANT

## 2024-01-29 PROCEDURE — 81001 URINALYSIS AUTO W/SCOPE: CPT

## 2024-01-29 RX ORDER — MORPHINE SULFATE 4 MG/ML
4 INJECTION, SOLUTION INTRAMUSCULAR; INTRAVENOUS
Status: COMPLETED | OUTPATIENT
Start: 2024-01-29 | End: 2024-01-29

## 2024-01-29 RX ORDER — ONDANSETRON 2 MG/ML
4 INJECTION INTRAMUSCULAR; INTRAVENOUS
Status: COMPLETED | OUTPATIENT
Start: 2024-01-29 | End: 2024-01-29

## 2024-01-29 RX ORDER — 0.9 % SODIUM CHLORIDE 0.9 %
1000 INTRAVENOUS SOLUTION INTRAVENOUS ONCE
Status: COMPLETED | OUTPATIENT
Start: 2024-01-29 | End: 2024-01-29

## 2024-01-29 RX ADMIN — SODIUM CHLORIDE 1000 ML: 9 INJECTION, SOLUTION INTRAVENOUS at 18:00

## 2024-01-29 RX ADMIN — ONDANSETRON 4 MG: 2 INJECTION INTRAMUSCULAR; INTRAVENOUS at 18:01

## 2024-01-29 RX ADMIN — IOPAMIDOL 100 ML: 755 INJECTION, SOLUTION INTRAVENOUS at 18:27

## 2024-01-29 RX ADMIN — MORPHINE SULFATE 4 MG: 4 INJECTION, SOLUTION INTRAMUSCULAR; INTRAVENOUS at 18:01

## 2024-01-29 ASSESSMENT — PAIN SCALES - GENERAL: PAINLEVEL_OUTOF10: 10

## 2024-01-29 ASSESSMENT — PAIN - FUNCTIONAL ASSESSMENT: PAIN_FUNCTIONAL_ASSESSMENT: NONE - DENIES PAIN

## 2024-01-29 NOTE — ED PROVIDER NOTES
promethazine 25 MG tablet  Commonly known as: PHENERGAN     RABEprazole 20 MG tablet  Commonly known as: ACIPHEX     rosuvastatin 5 MG tablet  Commonly known as: CRESTOR     spironolactone 25 MG tablet  Commonly known as: ALDACTONE     zolpidem 10 MG tablet  Commonly known as: AMBIEN                DISCONTINUED MEDICATIONS:  Current Discharge Medication List        I have seen and evaluated the patient autonomously. My supervision physician was on site and available for consultation if needed.     I am the Primary Clinician of Record.   ASHWIN Kennedy (electronically signed)    (Please note that parts of this dictation were completed with voice recognition software. Quite often unanticipated grammatical, syntax, homophones, and other interpretive errors are inadvertently transcribed by the computer software. Please disregards these errors. Please excuse any errors that have escaped final proofreading.)       Melanie Adkins PA  01/29/24 [id number]

## 2024-01-30 NOTE — DISCHARGE INSTRUCTIONS
145 mmol/L    Potassium 3.1 (L) 3.5 - 5.1 mmol/L    Chloride 109 (H) 97 - 108 mmol/L    CO2 25 21 - 32 mmol/L    Anion Gap 6 5 - 15 mmol/L    Glucose 155 (H) 65 - 100 mg/dL    BUN 9 6 - 20 MG/DL    Creatinine 0.87 0.55 - 1.02 MG/DL    Bun/Cre Ratio 10 (L) 12 - 20      Est, Glom Filt Rate >60 >60 ml/min/1.73m2    Calcium 8.7 8.5 - 10.1 MG/DL    Total Bilirubin 0.3 0.2 - 1.0 MG/DL    ALT 37 12 - 78 U/L    AST 16 15 - 37 U/L    Alk Phosphatase 80 45 - 117 U/L    Total Protein 7.2 6.4 - 8.2 g/dL    Albumin 3.6 3.5 - 5.0 g/dL    Globulin 3.6 2.0 - 4.0 g/dL    Albumin/Globulin Ratio 1.0 (L) 1.1 - 2.2     Lipase    Collection Time: 01/29/24  4:54 PM   Result Value Ref Range    Lipase 48 13 - 75 U/L   Urinalysis with Reflex to Culture    Collection Time: 01/29/24  7:34 PM    Specimen: Urine   Result Value Ref Range    Color, UA YELLOW/STRAW      Appearance CLEAR CLEAR      Specific Gravity, UA 1.005 1.003 - 1.030      pH, Urine 7.5 5.0 - 8.0      Protein, UA Negative NEG mg/dL    Glucose, UA Negative NEG mg/dL    Ketones, Urine Negative NEG mg/dL    Bilirubin Urine Negative NEG      Blood, Urine Negative NEG      Urobilinogen, Urine 0.2 0.2 - 1.0 EU/dL    Nitrite, Urine Negative NEG      Leukocyte Esterase, Urine Negative NEG      Urine Culture if Indicated CULTURE NOT INDICATED BY UA RESULT CNI      WBC, UA 0-4 0 - 4 /hpf    RBC, UA 0-5 0 - 5 /hpf    Epithelial Cells UA MODERATE (A) FEW /lpf    BACTERIA, URINE 1+ (A) NEG /hpf    Hyaline Casts, UA 0-2 0 - 2 /lpf     CT ABDOMEN PELVIS W IV CONTRAST Additional Contrast? None   Final Result      1. No acute abdominal or pelvic pathology.   2. Diffuse fatty infiltration of the liver.   3. Horseshoe kidney, a normal anatomical variant.          The exam and treatment you received in the Emergency Department were for an urgent problem and are not intended as complete care. It is important that you follow up with a doctor, nurse practitioner, or physician assistant for ongoing

## 2025-01-23 ENCOUNTER — TRANSCRIBE ORDERS (OUTPATIENT)
Facility: HOSPITAL | Age: 50
End: 2025-01-23

## 2025-01-23 DIAGNOSIS — M72.2 PLANTAR FASCIAL FIBROMATOSIS: Primary | ICD-10-CM

## 2025-01-23 DIAGNOSIS — S93.691A SPRING LIGAMENT TEAR, RIGHT, INITIAL ENCOUNTER: ICD-10-CM

## 2025-02-06 ENCOUNTER — HOSPITAL ENCOUNTER (OUTPATIENT)
Age: 50
Discharge: HOME | End: 2025-02-09
Payer: COMMERCIAL

## 2025-02-06 DIAGNOSIS — S93.691A SPRING LIGAMENT TEAR, RIGHT, INITIAL ENCOUNTER: ICD-10-CM

## 2025-02-06 DIAGNOSIS — M72.2 PLANTAR FASCIAL FIBROMATOSIS: ICD-10-CM

## 2025-02-06 PROCEDURE — 73718 MRI LOWER EXTREMITY W/O DYE: CPT
